# Patient Record
Sex: FEMALE | URBAN - METROPOLITAN AREA
[De-identification: names, ages, dates, MRNs, and addresses within clinical notes are randomized per-mention and may not be internally consistent; named-entity substitution may affect disease eponyms.]

---

## 2024-10-22 NOTE — PROGRESS NOTES
History of Present Illness    Huong Carlos is a 33 y.o. female who is seen at the request of Dr. Payam Johnson for the evaluation and management of a right parotid lesion.  I personally reviewed the scan that was done in October 2024.  It does show this mass in the substance of the right thyroid.  It measures probably more than a centimeter.  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  Of note is that this was discovered because the patient had a left-sided facial paralysis which should prompt some scanning that showed this contralateral lesion.  Her left-sided facial paralysis has recovered with the use of steroids.      Past Medical History    The past medical history and review of the system only shows some prior fibromas of the uterus for which she had surgery.  She also was diagnosed with Lyme's disease.  She does not take any medication.  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  She also has intolerance of a few pain medicines.  She does not smoke.  She is in school.  She is studying to be a .  She is here today with her .    Physical Exam    The patient is alert and oriented. Examination of the external ears, ear canals, and eardrums, is within normal limits. Examination of the anterior and external nose is negative. Examination of the oral cavity and oropharynx is normal. There is no evidence of any mucosal lesions. There is good mobility of the tongue and palate. There is good mandibular excursion. Palpation of the parotid, neck, and thyroid field fails to show any worrisome masses or adenopathies.  More specifically I cannot appreciate any masses around the parotid and I do not see any facial nerve weakness.    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  The pathology will be reviewed.  There is no question that the given her age that surgical removal is the option of choice.  She is  told about the surgery and the possible complications especially in relation to the proximity of the facial nerve.  She is therefore scheduled for a right parotidectomy with possible abdominal fat graft.  The patient wishes to go ahead with the surgery.    I will see her at the time of the surgery.

## 2024-10-23 ENCOUNTER — OFFICE VISIT (OUTPATIENT)
Dept: OTOLARYNGOLOGY | Facility: CLINIC | Age: 34
End: 2024-10-23
Payer: COMMERCIAL

## 2024-10-23 VITALS — TEMPERATURE: 96.8 F | WEIGHT: 150 LBS | HEIGHT: 67 IN | BODY MASS INDEX: 23.54 KG/M2

## 2024-10-23 DIAGNOSIS — K11.9 LESION OF PAROTID GLAND: Primary | ICD-10-CM

## 2024-10-23 PROCEDURE — 99244 OFF/OP CNSLTJ NEW/EST MOD 40: CPT | Performed by: OTOLARYNGOLOGY

## 2024-10-23 RX ORDER — ERGOCALCIFEROL 1.25 MG/1
CAPSULE ORAL
COMMUNITY
Start: 2023-09-15

## 2024-10-23 RX ORDER — SUMATRIPTAN SUCCINATE 100 MG/1
100 TABLET ORAL DAILY PRN
COMMUNITY

## 2024-10-23 RX ORDER — CYANOCOBALAMIN 1000 UG/ML
INJECTION, SOLUTION INTRAMUSCULAR; SUBCUTANEOUS
COMMUNITY
Start: 2023-08-11

## 2024-10-23 ASSESSMENT — PATIENT HEALTH QUESTIONNAIRE - PHQ9
1. LITTLE INTEREST OR PLEASURE IN DOING THINGS: NOT AT ALL
2. FEELING DOWN, DEPRESSED OR HOPELESS: NOT AT ALL
SUM OF ALL RESPONSES TO PHQ9 QUESTIONS 1 AND 2: 0

## 2024-10-30 ENCOUNTER — LAB REQUISITION (OUTPATIENT)
Dept: LAB | Facility: HOSPITAL | Age: 34
End: 2024-10-30
Payer: COMMERCIAL

## 2024-10-30 LAB
LAB AP ASR DISCLAIMER: NORMAL
LABORATORY COMMENT REPORT: NORMAL
PATH REPORT.FINAL DX SPEC: NORMAL
PATH REPORT.GROSS SPEC: NORMAL
PATH REPORT.TOTAL CANCER: NORMAL

## 2024-10-30 PROCEDURE — 88321 CONSLTJ&REPRT SLD PREP ELSWR: CPT | Performed by: STUDENT IN AN ORGANIZED HEALTH CARE EDUCATION/TRAINING PROGRAM

## 2024-11-06 ENCOUNTER — CLINICAL SUPPORT (OUTPATIENT)
Dept: PREADMISSION TESTING | Facility: HOSPITAL | Age: 34
End: 2024-11-06
Payer: COMMERCIAL

## 2024-11-06 NOTE — CPM/PAT NURSE NOTE
CPM/PAT Nurse Note      Name: Huong Carlos (Huong Carlos)  /Age:  y.o.       Past Medical History:   Diagnosis Date    ADHD (attention deficit hyperactivity disorder)     Lesion of parotid gland     Migraines        Past Surgical History:   Procedure Laterality Date    CARPAL TUNNEL RELEASE      HYSTERECTOMY      TUBAL LIGATION         Patient  has no history on file for sexual activity.    No family history on file.    Allergies   Allergen Reactions    Aspirin Anaphylaxis and Unknown     Other Reaction(s): Anaphylactic reaction to food    Clonidine Unknown and GI Upset     Other Reaction(s): extreme fatigue    Ibuprofen Anaphylaxis, GI bleeding and Unknown     Other Reaction(s): Anaphylactic reaction to food    Tramadol Anaphylaxis, Nausea And Vomiting, GI Upset, Unknown and Nausea/vomiting       Prior to Admission medications    Medication Sig Start Date End Date Taking? Authorizing Provider   cyanocobalamin (Vitamin B-12) 1,000 mcg/mL injection 1 mL Injection once a month for 90 days 23   Historical Provider, MD   ergocalciferol (Vitamin D-2) 1.25 MG (92286 UT) capsule Dose : 50,000 International_Unit = 1 cap(s), Oral, 2x/Wk, # 30 cap(s), 0 Refill(s) 9/15/23   Historical Provider, MD   SUMAtriptan (Imitrex) 100 mg tablet Take 1 tablet (100 mg) by mouth once daily as needed.    Historical Provider, MD SUBRAMANIAN ROS     DASI Risk Score    No data to display       Caprini DVT Assessment    No data to display       Modified Frailty Index    No data to display       CHADS2 Stroke Risk  Current as of 15 minutes ago        N/A 3 to 100%: High Risk   2 to < 3%: Medium Risk   0 to < 2%: Low Risk     Last Change: N/A          This score determines the patient's risk of having a stroke if the patient has atrial fibrillation.        This score is not applicable to this patient. Components are not calculated.          Revised Cardiac Risk Index    No data to display       Apfel Simplified  Score    No data to display       Risk Analysis Index Results This Encounter    No data found in the last 10 encounters.       Prodigy: High Risk  Total Score: 0          ARISCAT Score for Postoperative Pulmonary Complications    No data to display       [place] Perioperative Risk for Myocardial Infarction or Cardiac Arrest ([place])    No data to display     Scheduled for parotidectomy-right with excision adipose tissue graft torso with Dr. Almeida on 12/2/24  PCP: Dr. Sevilla P: [phone] F:[phone]  Otolaryngology:  Benedict Almeida MD LOV 10/23/24 seen at the request of Dr. Payam Johnson for the evaluation and management of a right parotid lesion.   ENT: Dr. Johnson  OBGYN: Magaly Swan  - CT ABDOMEN PELVIS WO CONTRAST; 7/3/24  OPINION:   Normal CT of the abdomen and pelvis.     Nurse Plan of Action:   Requested PCP office note   ONLY    Miriam Anderson RN  Pre-Admission Testing

## 2024-11-13 ENCOUNTER — PRE-ADMISSION TESTING (OUTPATIENT)
Dept: PREADMISSION TESTING | Facility: HOSPITAL | Age: 34
End: 2024-11-13
Payer: COMMERCIAL

## 2024-11-13 VITALS
HEIGHT: 66 IN | SYSTOLIC BLOOD PRESSURE: 104 MMHG | WEIGHT: 154.98 LBS | DIASTOLIC BLOOD PRESSURE: 71 MMHG | TEMPERATURE: 97.4 F | HEART RATE: 82 BPM | OXYGEN SATURATION: 99 % | BODY MASS INDEX: 24.91 KG/M2

## 2024-11-13 DIAGNOSIS — K11.9 LESION OF PAROTID GLAND: ICD-10-CM

## 2024-11-13 LAB
ANION GAP SERPL CALC-SCNC: 13 MMOL/L (ref 10–20)
BUN SERPL-MCNC: 11 MG/DL (ref 6–23)
CALCIUM SERPL-MCNC: 10.4 MG/DL (ref 8.6–10.6)
CHLORIDE SERPL-SCNC: 102 MMOL/L (ref 98–107)
CO2 SERPL-SCNC: 28 MMOL/L (ref 21–32)
CREAT SERPL-MCNC: 1.07 MG/DL (ref 0.5–1.05)
EGFRCR SERPLBLD CKD-EPI 2021: 71 ML/MIN/1.73M*2
ERYTHROCYTE [DISTWIDTH] IN BLOOD BY AUTOMATED COUNT: 12.1 % (ref 11.5–14.5)
GLUCOSE SERPL-MCNC: 85 MG/DL (ref 74–99)
HCT VFR BLD AUTO: 45.4 % (ref 36–46)
HGB BLD-MCNC: 15 G/DL (ref 12–16)
MCH RBC QN AUTO: 31 PG (ref 26–34)
MCHC RBC AUTO-ENTMCNC: 33 G/DL (ref 32–36)
MCV RBC AUTO: 94 FL (ref 80–100)
NRBC BLD-RTO: 0 /100 WBCS (ref 0–0)
PLATELET # BLD AUTO: 290 X10*3/UL (ref 150–450)
POTASSIUM SERPL-SCNC: 5.1 MMOL/L (ref 3.5–5.3)
RBC # BLD AUTO: 4.84 X10*6/UL (ref 4–5.2)
SODIUM SERPL-SCNC: 138 MMOL/L (ref 136–145)
WBC # BLD AUTO: 4.3 X10*3/UL (ref 4.4–11.3)

## 2024-11-13 PROCEDURE — 87081 CULTURE SCREEN ONLY: CPT

## 2024-11-13 PROCEDURE — 99244 OFF/OP CNSLTJ NEW/EST MOD 40: CPT | Performed by: NURSE PRACTITIONER

## 2024-11-13 PROCEDURE — 85027 COMPLETE CBC AUTOMATED: CPT

## 2024-11-13 PROCEDURE — 99406 BEHAV CHNG SMOKING 3-10 MIN: CPT | Performed by: NURSE PRACTITIONER

## 2024-11-13 PROCEDURE — 36415 COLL VENOUS BLD VENIPUNCTURE: CPT

## 2024-11-13 PROCEDURE — 82374 ASSAY BLOOD CARBON DIOXIDE: CPT

## 2024-11-13 RX ORDER — CHLORHEXIDINE GLUCONATE ORAL RINSE 1.2 MG/ML
SOLUTION DENTAL
Qty: 15 ML | Refills: 0 | Status: SHIPPED | OUTPATIENT
Start: 2024-11-13

## 2024-11-13 RX ORDER — CHLORHEXIDINE GLUCONATE 40 MG/ML
SOLUTION TOPICAL
Qty: 473 ML | Refills: 0 | Status: SHIPPED | OUTPATIENT
Start: 2024-11-13

## 2024-11-13 ASSESSMENT — ENCOUNTER SYMPTOMS
NECK SWELLING: 1
EYES NEGATIVE: 1
RESPIRATORY NEGATIVE: 1
NECK NEGATIVE: 1
CONSTITUTIONAL NEGATIVE: 1
TROUBLE SWALLOWING: 1
NEUROLOGICAL NEGATIVE: 1
GASTROINTESTINAL NEGATIVE: 1
NECK PAIN: 1
NECK MASS: 1
CARDIOVASCULAR NEGATIVE: 1
ENDOCRINE NEGATIVE: 1

## 2024-11-13 ASSESSMENT — DUKE ACTIVITY SCORE INDEX (DASI)
DASI METS SCORE: 9.9
CAN YOU DO HEAVY WORK AROUND THE HOUSE LIKE SCRUBBING FLOORS OR LIFTING AND MOVING HEAVY FURNITURE: YES
CAN YOU PARTICIPATE IN STRENOUS SPORTS LIKE SWIMMING, SINGLES TENNIS, FOOTBALL, BASKETBALL, OR SKIING: YES
CAN YOU DO MODERATE WORK AROUND THE HOUSE LIKE VACUUMING, SWEEPING FLOORS OR CARRYING GROCERIES: YES
CAN YOU DO LIGHT WORK AROUND THE HOUSE LIKE DUSTING OR WASHING DISHES: YES
CAN YOU RUN A SHORT DISTANCE: YES
TOTAL_SCORE: 58.2
CAN YOU WALK INDOORS, SUCH AS AROUND YOUR HOUSE: YES
CAN YOU TAKE CARE OF YOURSELF (EAT, DRESS, BATHE, OR USE TOILET): YES
CAN YOU DO YARD WORK LIKE RAKING LEAVES, WEEDING OR PUSHING A MOWER: YES
CAN YOU CLIMB A FLIGHT OF STAIRS OR WALK UP A HILL: YES
CAN YOU WALK A BLOCK OR TWO ON LEVEL GROUND: YES
CAN YOU PARTICIPATE IN MODERATE RECREATIONAL ACTIVITIES LIKE GOLF, BOWLING, DANCING, DOUBLES TENNIS OR THROWING A BASEBALL OR FOOTBALL: YES
CAN YOU HAVE SEXUAL RELATIONS: YES

## 2024-11-13 ASSESSMENT — LIFESTYLE VARIABLES: SMOKING_STATUS: NONSMOKER

## 2024-11-13 NOTE — PREPROCEDURE INSTRUCTIONS
Fasting Guidelines    NPO Instructions:    Do not eat any food after midnight the night before your surgery/procedure.  You may have up to 13.5 ounces of clear liquids until TWO hours before your instructed arrival time to the hospital. This includes water, black tea/coffee, (no milk or cream), apple juice, and/or electrolyte drinks (Gatorade).  You may chew gum up to TWO hours before your surgery/procedure.    Additional Instructions:     We have sent a prescription for Hibiclens soap and Peridex mouth wash to your preferred pharmacy.  If you have not already, Please  your prescription and start using as directed before surgery.  Follow the instruction sheet provided to you at your CPM/PAT appointment.    Avoid herbal supplements, multivitamins and NSAIDS (non-steroidal anti-inflammatory drugs) such as Advil, Aleve, Ibuprofen, Naproxen, Excedrin, Meloxicam or Celebrex for at least 7 days prior to surgery. May take Tylenol as needed.    Avoid tobacco and alcohol products for 24 hours prior to surgery.    CONTACT SURGEON'S OFFICE IF YOU DEVELOP:  * Fever = 100.4 F   * New respiratory symptoms (e.g. cough, shortness of breath, respiratory distress, sore throat)  * Recent loss of taste or smell  *Flu like symptoms such as headache, fatigue or gastrointestinal symptoms  * You develop any open sores, shingles, burning or painful urination   AND/OR:  * You no longer wish to have the surgery.  * Any other personal circumstances change that may lead to the need to cancel or defer this surgery.  *You were admitted to any hospital within one week of your planned procedure.    Seven/Six Days before Surgery:  Review your medication instructions, stop indicated medications    Day of Surgery:  Review your medication instructions, take indicated medications  Wear comfortable loose fitting clothing  Do not use moisturizers, creams, lotions or perfume  All jewelry and valuables should be left at home    Ramakrishna Daigle  [facility]  Usphr-[phone]  Jwz-[phone]  Email-[email or web address]      Patient Information: Pre-Operative Infection Prevention Measures     Why did I have my nose, under my arms, and groin swabbed?  The purpose of the swab is to identify Staphylococcus aureus inside your nose or on your skin.  The swab was sent to the laboratory for culture.  A positive swab/culture for Staphylococcus aureus is called colonization or carriage.      What is Staphylococcus aureus?  Staphylococcus aureus, also known as “staph”, is a germ found on the skin or in the nose of healthy people.  Sometimes Staphylococcus aureus can get into the body and cause an infection.  This can be minor (such as pimples, boils, or other skin problems).  It might also be serious (such as a blood infection, pneumonia, or a surgical site infection).    What is Staphylococcus aureus colonization or carriage?  Colonization or carriage means that a person has the germ but is not sick from it.  These bacteria can be spread on the hands or when breathing or sneezing.    How is Staphylococcus aureus spread?  It is most often spread by close contact with a person or item that carries it.    What happens if my culture is positive for Staphylococcus aureus?  Your doctor/medical team will use this information to guide any antibiotic treatment which may be necessary.  Regardless of the culture results, we will clean the inside of your nose with a betadine swab just before you have your surgery.      Will I get an infection if I have Staphylococcus aureus in my nose or on my skin?  Anyone can get an infection with Staphylococcus aureus.  However, the best way to reduce your risk of infection is to follow the instructions provided to you for the use of your CHG soap and dental rinse.        Patient Information: Oral/Dental Rinse    What is oral/dental rinse?   It is a mouthwash. It is a way of cleaning the mouth with a  germ-killing solution before your surgery.  The solution contains chlorhexidine, commonly known as CHG.   It is used inside the mouth to kill a bacteria known as Staphylococcus aureus.  Let your doctor know if you are allergic to Chlorhexidine.    Why do I need to use CHG oral/dental rinse?  The CHG oral/dental rinse helps to kill a bacteria in your mouth known as Staphylococcus aureus.     This reduces the risk of infection at the surgical site.      Using your CHG oral/dental rinse  STEPS:  Use your CHG oral/dental rinse after you brush your teeth the night before (at bedtime) and the morning of your surgery.  Follow all directions on your prescription label.    Use the cap on the container to measure 15ml   Swish (gargle if you can) the mouthwash in your mouth for at least 30 seconds, (do not swallow) and spit out  After you use your CHG rinse, do not rinse your mouth with water, drink or eat.  Please refer to the prescription label for the appropriate time to resume oral intake      What side effects might I have using the CHG oral/dental rinse?  CHG rinse will stick to plaque on the teeth.  Brush and floss just before use.  Teeth brushing will help avoid staining of plaque during use.      Patient Information: Home Preoperative Antibacterial Shower      What is a home preoperative antibacterial shower?  This shower is a way of cleaning the skin with a germ-killing solution before surgery.  The solution contains chlorhexidine, commonly known as CHG.  CHG is a skin cleanser with germ-killing ability.  Let your doctor know if you are allergic to chlorhexidine.    Why do I need to take a preoperative antibacterial shower?  Skin is not sterile.  It is best to try to make your skin as free of germs as possible before surgery.  Proper cleansing with a germ-killing soap before surgery can lower the number of germs on your skin.  This helps to reduce the risk of infection at the surgical site.  Following the  instructions listed below will help you prepare your skin for surgery.      How do I use the solution?  Steps:  Begin using your CHG soap 5 days before your scheduled surgery on ________________________.    First, wash and rinse your hair using the CHG soap. Keep CHG soap away from ear canals and eyes.  Rinse completely, do not condition.  Hair extensions should be removed.  Wash your face with your normal soap and rinse.    Apply the CHG solution to a clean wet washcloth.  Turn the water off or move away from the water spray to avoid premature rinsing of the CHG soap as you are applying.   Firmly lather your entire body from the neck down.  Do not use on your face.  Pay special attention to the area(s) where your incision(s) will be located unless they are on your face.  Avoid scrubbing your skin too hard.  The important point is to have the CHG soap sit on your skin for 3 minutes.    When the 3 minutes are up, turn on the water and rinse the CHG solution off your body completely.   DO NOT wash with regular soap after you have used the CHG soap solution  Pat yourself dry with a clean, freshly-laundered towel.  DO NOT apply powders, deodorants, or lotions.  Dress in clean, freshly laundered nightclothes.    Be sure to sleep with clean, freshly laundered sheets.  Be aware that CHG will cause stains on fabrics; if you wash them with bleach after use.  Rinse your washcloth and other linens that have contact with CHG completely.  Use only non-chlorine detergents to launder the items used.   The morning of surgery is the fifth day.  Repeat the above steps and dress in clean comfortable clothing     Whom should I contact if I have any questions regarding the use of CHG soap?  Call the [facility] at [phone] if you have any questions.               Preoperative Brain Exercises    What are brain exercises?  A brain exercise is any activity that  engages your thinking (cognitive) skills.    What types of activities are considered brain exercises?  Jigsaw puzzles, crossword puzzles, word jumble, memory games, word search, and many more.  Many can be found free online or on your phone via a mobile evy.    Why should I do brain exercises before my surgery?  More recent research has shown brain exercise before surgery can lower the risk of postoperative delirium (confusion) which can be especially important for older adults.  Patients who did brain exercises for 5 to 10 hours the days before surgery, cut their risk of postoperative delirium in half up to 1 week after surgery.         [facility]    Preoperative Deep Breathing Exercises    Why it is important to do deep breathing exercises before my surgery?  Deep breathing exercises strengthen your breathing muscles.  This helps you to recover after your surgery and decreases the chance of breathing complications.      How are the deep breathing exercises done?  Sit straight with your back supported.  Breathe in deeply and slowly through your nose. Your lower rib cage should expand and your abdomen may move forward.  Hold that breath for 3 to 5 seconds.  Breathe out through pursed lips, slowly and completely.  Rest and repeat 10 times every hour while awake.  Rest longer if you become dizzy or lightheaded.         Patient and Family Education             Ways You Can Help Prevent Blood Clots             This handout explains some simple things you can do to help prevent blood clots.      Blood clots are blockages that can form in the body's veins. When a blood clot forms in your deep veins, it may be called a deep vein thrombosis, or DVT for short. Blood clots can happen in any part of the body where blood flows, but they are most common in the arms and legs. If a piece of a blood clot breaks free and travels to the lungs, it is called a pulmonary embolus (PE). A PE can be a very  serious problem.         Being in the hospital or having surgery can raise your chances of getting a blood clot because you may not be well enough to move around as much as you normally do.         Ways you can help prevent blood clots in the hospital         Wearing SCDs. SCDs stands for Sequential Compression Devices.   SCDs are special sleeves that wrap around your legs  They attach to a pump that fills them with air to gently squeeze your legs every few minutes.   This helps return the blood in your legs to your heart.   SCDs should only be taken off when walking or bathing.   SCDs may not be comfortable, but they can help save your life.               Wearing compression stockings - if your doctor orders them. These special snug fitting stockings gently squeeze your legs to help blood flow.       Walking. Walking helps move the blood in your legs.   If your doctor says it is ok, try walking the halls at least   5 times a day. Ask us to help you get up, so you don't fall.      Taking any blood thinning medicines your doctor orders.        Page 1 of 2     [facility]; 3/23   Ways you can help prevent blood clots at home       Wearing compression stockings - if your doctor orders them. ? Walking - to help move the blood in your legs.       Taking any blood thinning medicines your doctor orders.      Signs of a blood clot or PE      Tell your doctor or nurse know right away if you have of the problems listed below.    If you are at home, seek medical care right away. Call 911 for chest pain or problems breathing.               Signs of a blood clot (DVT) - such as pain,  swelling, redness or warmth in your arm or leg      Signs of a pulmonary embolism (PE) - such as chest     pain or feeling short of breath

## 2024-11-13 NOTE — H&P (VIEW-ONLY)
CPM/PAT Evaluation       Name: Huong Carlos (Huong Carlos)  /Age:  y.o.     Visit Type:   In-Person       Chief Complaint: preop eval    HPI  The patient is a 33 year old female with right parotid lesion. 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 She currently denies fever, chills, n/v, abdominal pain, chest pain, sob or dysphagia. She presents today for perioperative evaluation in anticipation of Parotidectomy - Right  Excision Adipose Tissue Graft Torso on 24 with Dr. Almeida.    Past Medical History:   Diagnosis Date    ADHD (attention deficit hyperactivity disorder)     Anemia     Dysphagia     Fibroid     s/p hysterectomy    History of recent steroid use     Lesion of parotid gland     Migraines        Past Surgical History:   Procedure Laterality Date    CARPAL TUNNEL RELEASE Right     x3    COLONOSCOPY      ENDOMETRIAL ABLATION      HYSTERECTOMY      SHOULDER Right     TUBAL LIGATION         Patient Sexual activity questions deferred to the physician.    Family History   Problem Relation Name Age of Onset    PONV Mother      Breast cancer Mother      Hypertension Father      Blood clot Father         Allergies   Allergen Reactions    Aspirin Anaphylaxis and Unknown     Other Reaction(s): Anaphylactic reaction to food    Clonidine Unknown and GI Upset     Other Reaction(s): extreme fatigue    Ibuprofen Anaphylaxis, GI bleeding and Unknown     Other Reaction(s): Anaphylactic reaction to food    Tramadol Anaphylaxis, Nausea And Vomiting, GI Upset, Unknown and Nausea/vomiting       Prior to Admission medications    Medication Sig Start Date End Date Taking? Authorizing Provider   cyanocobalamin (Vitamin B-12) 1,000 mcg/mL injection 1 mL Injection once a month for 90 days 23   Historical Provider, MD   ergocalciferol (Vitamin D-2) 1.25 MG (09191 UT) capsule Dose : 50,000 International_Unit = 1 cap(s), Oral, 2x/Wk, # 30 cap(s), 0 Refill(s) 9/15/23    "Historical Provider, MD   SUMAtriptan (Imitrex) 100 mg tablet Take 1 tablet (100 mg) by mouth once daily as needed.    Historical Provider, MD SUBRAMANIAN ROS:   Constitutional:   neg    Neuro/Psych:   neg    Eyes:   neg     use of corrective lenses  Ears:   Nose:   neg    Mouth:   neg    Throat:   neg     dysphagia (intermittent)  Neck:    intermittent  neg     neck pain   neck swelling   neck masses  Cardio:   neg    Respiratory:   neg    Endocrine:   neg    GI:   neg    :   neg    Musculoskeletal:   Hematologic:   neg    Skin:  neg        Physical Exam  Vitals reviewed.   Constitutional:       Appearance: Normal appearance.   HENT:      Head: Normocephalic and atraumatic.      Nose: Nose normal.      Mouth/Throat:      Mouth: Mucous membranes are moist.      Pharynx: Oropharynx is clear.   Eyes:      Extraocular Movements: Extraocular movements intact.      Conjunctiva/sclera: Conjunctivae normal.      Pupils: Pupils are equal, round, and reactive to light.   Neck:      Comments: intermittent  Cardiovascular:      Rate and Rhythm: Normal rate and regular rhythm.      Pulses: Normal pulses.      Heart sounds: Normal heart sounds.   Pulmonary:      Effort: Pulmonary effort is normal.      Breath sounds: Normal breath sounds.   Musculoskeletal:         General: Normal range of motion.      Cervical back: Normal range of motion. Tenderness present.   Skin:     General: Skin is warm and dry.   Neurological:      General: No focal deficit present.      Mental Status: She is alert and oriented to person, place, and time.   Psychiatric:         Mood and Affect: Mood normal.         Behavior: Behavior normal.          PAT AIRWAY:   Airway:     Mallampati::  I    TM distance::  >3 FB    Neck ROM::  Full   Upper and lower   partials      Visit Vitals  /71   Pulse 82   Temp 36.3 °C (97.4 °F)   Ht 1.676 m (5' 6\")   Wt 70.3 kg (154 lb 15.7 oz)   SpO2 99%   BMI 25.01 kg/m²   Smoking Status Never   BSA 1.81 m²    "       DASI Risk Score      Flowsheet Row Pre-Admission Testing from 11/13/2024 in [facility]   Can you take care of yourself (eat, dress, bathe, or use toilet)?  2.75 filed at 11/13/2024 1012   Can you walk indoors, such as around your house? 1.75 filed at 11/13/2024 1012   Can you walk a block or two on level ground?  2.75 filed at 11/13/2024 1012   Can you climb a flight of stairs or walk up a hill? 5.5 filed at 11/13/2024 1012   Can you run a short distance? 8 filed at 11/13/2024 1012   Can you do light work around the house like dusting or washing dishes? 2.7 filed at 11/13/2024 1012   Can you do moderate work around the house like vacuuming, sweeping floors or carrying groceries? 3.5 filed at 11/13/2024 1012   Can you do heavy work around the house like scrubbing floors or lifting and moving heavy furniture?  8 filed at 11/13/2024 1012   Can you do yard work like raking leaves, weeding or pushing a mower? 4.5 filed at 11/13/2024 1012   Can you have sexual relations? 5.25 filed at 11/13/2024 1012   Can you participate in moderate recreational activities like golf, bowling, dancing, doubles tennis or throwing a baseball or football? 6 filed at 11/13/2024 1012   Can you participate in strenous sports like swimming, singles tennis, football, basketball, or skiing? 7.5 filed at 11/13/2024 1012   DASI SCORE 58.2 filed at 11/13/2024 1012   METS Score (Will be calculated only when all the questions are answered) 9.9 filed at 11/13/2024 1012          Caprini DVT Assessment      Flowsheet Row Pre-Admission Testing from 11/13/2024 in [facility]   DVT Score 3 filed at 11/13/2024 1013   Surgical Factors Major surgery planned, including arthroscopic and laproscopic (1-2 hours) filed at 11/13/2024 1013   BMI 30 or less filed at 11/13/2024 1013          Modified Frailty Index      Flowsheet Row Pre-Admission Testing from 11/13/2024 in [facility]   Non-independent functional  status (problems with dressing, bathing, personal grooming, or cooking) 0 filed at 11/13/2024 1014   History of diabetes mellitus  0 filed at 11/13/2024 1014   History of COPD 0 filed at 11/13/2024 1014   History of CHF No filed at 11/13/2024 1014   History of MI 0 filed at 11/13/2024 1014   History of Percutaneous Coronary Intervention, Cardiac Surgery, or Angina No filed at 11/13/2024 1014   Hypertension requiring the use of medication  0 filed at 11/13/2024 1014   Peripheral vascular disease 0 filed at 11/13/2024 1014   Impaired sensorium (cognitive impairement or loss, clouding, or delirium) 0 filed at 11/13/2024 1014   TIA or CVA withouy residual deficit 0 filed at 11/13/2024 1014   Cerebrovascular accident with deficit 0 filed at 11/13/2024 1014   Modified Frailty Index Calculator 0 filed at 11/13/2024 1014          CHADS2 Stroke Risk  Current as of today        N/A 3 to 100%: High Risk   2 to < 3%: Medium Risk   0 to < 2%: Low Risk     Last Change: N/A          This score determines the patient's risk of having a stroke if the patient has atrial fibrillation.        This score is not applicable to this patient. Components are not calculated.          Revised Cardiac Risk Index      Flowsheet Row Pre-Admission Testing from 11/13/2024 in [facility]   High-Risk Surgery (Intraperitoneal, Intrathoracic,Suprainguinal vascular) 0 filed at 11/13/2024 1013   History of ischemic heart disease (History of MI, History of positive exercuse test, Current chest paint considered due to myocardial ischemia, Use of nitrate therapy, ECG with pathological Q Waves) 0 filed at 11/13/2024 1013   History of congestive heart failure (pulmonary edemia, bilateral rales or S3 gallop, Paroxysmal nocturnal dyspnea, CXR showing pulmonary vascular redistribution) 0 filed at 11/13/2024 1013   History of cerebrovascular disease (Prior TIA or stroke) 0 filed at 11/13/2024 1013   Pre-operative insulin treatment 0 filed at  11/13/2024 1013   Pre-operative creatinine>2 mg/dl 0 filed at 11/13/2024 1013   Revised Cardiac Risk Calculator 0 filed at 11/13/2024 1013          Apfel Simplified Score      Flowsheet Row Pre-Admission Testing from 11/13/2024 in [facility]   Smoking status 1 filed at 11/13/2024 1013   History of motion sickness or PONV  0 filed at 11/13/2024 1013   Use of postoperative opioids 1 filed at 11/13/2024 1013   Gender - Female 1=Yes filed at 11/13/2024 1013   Apfel Simplified Score Calculator 3 filed at 11/13/2024 1013          Risk Analysis Index Results This Encounter    No data found in the last 10 encounters.       Stop Bang Score      Flowsheet Row Pre-Admission Testing from 11/13/2024 in [facility]   Do you snore loudly? 0 filed at 11/13/2024 1012   Do you often feel tired or fatigued after your sleep? 0 filed at 11/13/2024 1012   Has anyone ever observed you stop breathing in your sleep? 0 filed at 11/13/2024 1012   Do you have or are you being treated for high blood pressure? 0 filed at 11/13/2024 1012   Recent BMI (Calculated) 23.9 filed at 11/13/2024 1012   Is BMI greater than 35 kg/m2? 0=No filed at 11/13/2024 1012   Age older than 50 years old? 0=No filed at 11/13/2024 1012   Is your neck circumference greater than 17 inches (Male) or 16 inches (Female)? 0 filed at 11/13/2024 1012   Gender - Male 0=No filed at 11/13/2024 1012   STOP-BANG Total Score 0 filed at 11/13/2024 1012          Prodigy: High Risk  Total Score: 0          ARISCAT Score for Postoperative Pulmonary Complications    No data to display       [place] Perioperative Risk for Myocardial Infarction or Cardiac Arrest (STERLING)    No data to display       Recent Results (from the past week)   Staphylococcus aureus/MRSA colonization, Culture    Collection Time: 11/13/24 10:44 AM    Specimen: Anterior Nares; Swab   Result Value Ref Range    Staph/MRSA Screen Culture No Staphylococcus aureus isolated    Basic Metabolic  Panel    Collection Time: 11/13/24 10:44 AM   Result Value Ref Range    Glucose 85 74 - 99 mg/dL    Sodium 138 136 - 145 mmol/L    Potassium 5.1 3.5 - 5.3 mmol/L    Chloride 102 98 - 107 mmol/L    Bicarbonate 28 21 - 32 mmol/L    Anion Gap 13 10 - 20 mmol/L    Urea Nitrogen 11 6 - 23 mg/dL    Creatinine 1.07 (H) 0.50 - 1.05 mg/dL    eGFR 71 >60 mL/min/1.73m*2    Calcium 10.4 8.6 - 10.6 mg/dL   CBC    Collection Time: 11/13/24 10:44 AM   Result Value Ref Range    WBC 4.3 (L) 4.4 - 11.3 x10*3/uL    nRBC 0.0 0.0 - 0.0 /100 WBCs    RBC 4.84 4.00 - 5.20 x10*6/uL    Hemoglobin 15.0 12.0 - 16.0 g/dL    Hematocrit 45.4 36.0 - 46.0 %    MCV 94 80 - 100 fL    MCH 31.0 26.0 - 34.0 pg    MCHC 33.0 32.0 - 36.0 g/dL    RDW 12.1 11.5 - 14.5 %    Platelets 290 150 - 450 x10*3/uL        Diagnostic Results    EKG 3/29/21(see care everywhere)  Normal sinus rhythm with sinus arrhythmia   Normal ECG   No previous ECGs available       Assessment and Plan:     Anesthesia  The patient denies problems with anesthesia in the past such as PONV, prolonged sedation, awareness, dental damage, aspiration, cardiac arrest, difficult intubation, or unexpected hospital admissions.     Cardiovascular  No diagnoses or significant findings on chart review or clinical presentation and evaluation.  She is scheduled for non-cardiac surgery associated with elevated risk. The patient has no major cardiac contraindications to non- cardiac surgery.  Cardiology Evaluation  The patient is not followed by cardiology.  METS  The patient's functional capacity capacity is greater than 4 METS.  RCRI  The patient meets 0 RCRI criteria and therefor has a 3.9% risk of major adverse cardiac complications.  STERLING score which indicates a 0% risk of intraoperative or 30-day postoperative MACE (major adverse cardiac event).    Pulmonary  No significant findings on chart review or clinical presentation and evaluation.    The patient has a stop bang score of 0, which places  patient at low risk for having KRISTEL.    ARISCAT 0, low, 1.6% risk of in-hospital postoperative pulmonary complications  PRODIGY 3, low risk of respiratory depression episode.     Patient given PI sheet for preoperative deep breathing exercises.  Encourage  incentive spirometry in the postoperative period as deemed necessary.    Neurology  #ADHD  -previously on adderall, not currently taking while in school, takes OTC multivitamins  #Migraines  -managed on Imitrex, instructed to hold day of surgery.  The patient is at increased risk for perioperative stroke secondary to female gender, general anesthesia. Handouts for preoperative brain exercises given to patient.    HEENT/Airway  #Parotid lesion  -intermittent facial paralysis, numbness and swelling, scheduled for right parotidectomy on 12/2 with Dr. Almeida.  No documented or reported history of airway difficulty.     Endocrine  No diagnoses or significant findings on chart review or clinical presentation and evaluation.    Gastrointestinal  No diagnoses or significant findings on chart review or clinical presentation and evaluation.    Eat 10- 0,  self-perceived oropharyngeal dysphagia scale (0-40)     Genitourinary  No diagnoses or significant findings on chart review or clinical presentation and evaluation.    Renal  No renal diagnoses or significant findings on chart review or clinical presentation and evaluation.    Hematology  No diagnoses or significant findings on chart review or clinical presentation and evaluation.    Caprini score 3, intermediate risk of perioperative VTE.     Patient instructed to ambulate as soon as possible postoperatively to decrease thromboembolic risk. Initiate mechanical DVT prophylaxis as soon as possible and initiate chemical prophylaxis when deemed safe from a bleeding standpoint post surgery.     Transfusion Evaluation  T&S not obtained. Low likelihood for perioperative transfusion of blood or blood  products.    Musculoskeletal  No diagnoses or significant findings on chart review or clinical presentation and evaluation.    ID  No diagnoses or significant findings on chart review or clinical presentation and evaluation. MRSA screening obtained. Prescriptions and instructions given for Hibiclens and Peridex.    -Preoperative medication instructions were provided and reviewed with the patient.  Any additional testing or evaluation was explained to the patient.  NPO Instructions were discussed, and the patient's questions were answered prior to conclusion of this encounter. Patient verbalized understanding of preoperative instructions. After Visit Summary given.

## 2024-11-13 NOTE — CPM/PAT H&P
CPM/PAT Evaluation       Name: Huong Carlos (Huong Carlos)  /Age:  y.o.     Visit Type:   In-Person       Chief Complaint: preop eval    HPI  The patient is a 33 year old female with right parotid lesion. 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 She currently denies fever, chills, n/v, abdominal pain, chest pain, sob or dysphagia. She presents today for perioperative evaluation in anticipation of Parotidectomy - Right  Excision Adipose Tissue Graft Torso on 24 with Dr. Almeida.    Past Medical History:   Diagnosis Date    ADHD (attention deficit hyperactivity disorder)     Anemia     Dysphagia     Fibroid     s/p hysterectomy    History of recent steroid use     Lesion of parotid gland     Migraines        Past Surgical History:   Procedure Laterality Date    CARPAL TUNNEL RELEASE Right     x3    COLONOSCOPY      ENDOMETRIAL ABLATION      HYSTERECTOMY      SHOULDER Right     TUBAL LIGATION         Patient Sexual activity questions deferred to the physician.    Family History   Problem Relation Name Age of Onset    PONV Mother      Breast cancer Mother      Hypertension Father      Blood clot Father         Allergies   Allergen Reactions    Aspirin Anaphylaxis and Unknown     Other Reaction(s): Anaphylactic reaction to food    Clonidine Unknown and GI Upset     Other Reaction(s): extreme fatigue    Ibuprofen Anaphylaxis, GI bleeding and Unknown     Other Reaction(s): Anaphylactic reaction to food    Tramadol Anaphylaxis, Nausea And Vomiting, GI Upset, Unknown and Nausea/vomiting       Prior to Admission medications    Medication Sig Start Date End Date Taking? Authorizing Provider   cyanocobalamin (Vitamin B-12) 1,000 mcg/mL injection 1 mL Injection once a month for 90 days 23   Historical Provider, MD   ergocalciferol (Vitamin D-2) 1.25 MG (32919 UT) capsule Dose : 50,000 International_Unit = 1 cap(s), Oral, 2x/Wk, # 30 cap(s), 0 Refill(s) 9/15/23    "Historical Provider, MD   SUMAtriptan (Imitrex) 100 mg tablet Take 1 tablet (100 mg) by mouth once daily as needed.    Historical Provider, MD SUBRAMANIAN ROS:   Constitutional:   neg    Neuro/Psych:   neg    Eyes:   neg     use of corrective lenses  Ears:   Nose:   neg    Mouth:   neg    Throat:   neg     dysphagia (intermittent)  Neck:    intermittent  neg     neck pain   neck swelling   neck masses  Cardio:   neg    Respiratory:   neg    Endocrine:   neg    GI:   neg    :   neg    Musculoskeletal:   Hematologic:   neg    Skin:  neg        Physical Exam  Vitals reviewed.   Constitutional:       Appearance: Normal appearance.   HENT:      Head: Normocephalic and atraumatic.      Nose: Nose normal.      Mouth/Throat:      Mouth: Mucous membranes are moist.      Pharynx: Oropharynx is clear.   Eyes:      Extraocular Movements: Extraocular movements intact.      Conjunctiva/sclera: Conjunctivae normal.      Pupils: Pupils are equal, round, and reactive to light.   Neck:      Comments: intermittent  Cardiovascular:      Rate and Rhythm: Normal rate and regular rhythm.      Pulses: Normal pulses.      Heart sounds: Normal heart sounds.   Pulmonary:      Effort: Pulmonary effort is normal.      Breath sounds: Normal breath sounds.   Musculoskeletal:         General: Normal range of motion.      Cervical back: Normal range of motion. Tenderness present.   Skin:     General: Skin is warm and dry.   Neurological:      General: No focal deficit present.      Mental Status: She is alert and oriented to person, place, and time.   Psychiatric:         Mood and Affect: Mood normal.         Behavior: Behavior normal.          PAT AIRWAY:   Airway:     Mallampati::  I    TM distance::  >3 FB    Neck ROM::  Full   Upper and lower   partials      Visit Vitals  /71   Pulse 82   Temp 36.3 °C (97.4 °F)   Ht 1.676 m (5' 6\")   Wt 70.3 kg (154 lb 15.7 oz)   SpO2 99%   BMI 25.01 kg/m²   Smoking Status Never   BSA 1.81 m²    "       DASI Risk Score      Flowsheet Row Pre-Admission Testing from 11/13/2024 in [facility]   Can you take care of yourself (eat, dress, bathe, or use toilet)?  2.75 filed at 11/13/2024 1012   Can you walk indoors, such as around your house? 1.75 filed at 11/13/2024 1012   Can you walk a block or two on level ground?  2.75 filed at 11/13/2024 1012   Can you climb a flight of stairs or walk up a hill? 5.5 filed at 11/13/2024 1012   Can you run a short distance? 8 filed at 11/13/2024 1012   Can you do light work around the house like dusting or washing dishes? 2.7 filed at 11/13/2024 1012   Can you do moderate work around the house like vacuuming, sweeping floors or carrying groceries? 3.5 filed at 11/13/2024 1012   Can you do heavy work around the house like scrubbing floors or lifting and moving heavy furniture?  8 filed at 11/13/2024 1012   Can you do yard work like raking leaves, weeding or pushing a mower? 4.5 filed at 11/13/2024 1012   Can you have sexual relations? 5.25 filed at 11/13/2024 1012   Can you participate in moderate recreational activities like golf, bowling, dancing, doubles tennis or throwing a baseball or football? 6 filed at 11/13/2024 1012   Can you participate in strenous sports like swimming, singles tennis, football, basketball, or skiing? 7.5 filed at 11/13/2024 1012   DASI SCORE 58.2 filed at 11/13/2024 1012   METS Score (Will be calculated only when all the questions are answered) 9.9 filed at 11/13/2024 1012          Caprini DVT Assessment      Flowsheet Row Pre-Admission Testing from 11/13/2024 in [facility]   DVT Score 3 filed at 11/13/2024 1013   Surgical Factors Major surgery planned, including arthroscopic and laproscopic (1-2 hours) filed at 11/13/2024 1013   BMI 30 or less filed at 11/13/2024 1013          Modified Frailty Index      Flowsheet Row Pre-Admission Testing from 11/13/2024 in [facility]   Non-independent functional  status (problems with dressing, bathing, personal grooming, or cooking) 0 filed at 11/13/2024 1014   History of diabetes mellitus  0 filed at 11/13/2024 1014   History of COPD 0 filed at 11/13/2024 1014   History of CHF No filed at 11/13/2024 1014   History of MI 0 filed at 11/13/2024 1014   History of Percutaneous Coronary Intervention, Cardiac Surgery, or Angina No filed at 11/13/2024 1014   Hypertension requiring the use of medication  0 filed at 11/13/2024 1014   Peripheral vascular disease 0 filed at 11/13/2024 1014   Impaired sensorium (cognitive impairement or loss, clouding, or delirium) 0 filed at 11/13/2024 1014   TIA or CVA withouy residual deficit 0 filed at 11/13/2024 1014   Cerebrovascular accident with deficit 0 filed at 11/13/2024 1014   Modified Frailty Index Calculator 0 filed at 11/13/2024 1014          CHADS2 Stroke Risk  Current as of today        N/A 3 to 100%: High Risk   2 to < 3%: Medium Risk   0 to < 2%: Low Risk     Last Change: N/A          This score determines the patient's risk of having a stroke if the patient has atrial fibrillation.        This score is not applicable to this patient. Components are not calculated.          Revised Cardiac Risk Index      Flowsheet Row Pre-Admission Testing from 11/13/2024 in [facility]   High-Risk Surgery (Intraperitoneal, Intrathoracic,Suprainguinal vascular) 0 filed at 11/13/2024 1013   History of ischemic heart disease (History of MI, History of positive exercuse test, Current chest paint considered due to myocardial ischemia, Use of nitrate therapy, ECG with pathological Q Waves) 0 filed at 11/13/2024 1013   History of congestive heart failure (pulmonary edemia, bilateral rales or S3 gallop, Paroxysmal nocturnal dyspnea, CXR showing pulmonary vascular redistribution) 0 filed at 11/13/2024 1013   History of cerebrovascular disease (Prior TIA or stroke) 0 filed at 11/13/2024 1013   Pre-operative insulin treatment 0 filed at  11/13/2024 1013   Pre-operative creatinine>2 mg/dl 0 filed at 11/13/2024 1013   Revised Cardiac Risk Calculator 0 filed at 11/13/2024 1013          Apfel Simplified Score      Flowsheet Row Pre-Admission Testing from 11/13/2024 in [facility]   Smoking status 1 filed at 11/13/2024 1013   History of motion sickness or PONV  0 filed at 11/13/2024 1013   Use of postoperative opioids 1 filed at 11/13/2024 1013   Gender - Female 1=Yes filed at 11/13/2024 1013   Apfel Simplified Score Calculator 3 filed at 11/13/2024 1013          Risk Analysis Index Results This Encounter    No data found in the last 10 encounters.       Stop Bang Score      Flowsheet Row Pre-Admission Testing from 11/13/2024 in [facility]   Do you snore loudly? 0 filed at 11/13/2024 1012   Do you often feel tired or fatigued after your sleep? 0 filed at 11/13/2024 1012   Has anyone ever observed you stop breathing in your sleep? 0 filed at 11/13/2024 1012   Do you have or are you being treated for high blood pressure? 0 filed at 11/13/2024 1012   Recent BMI (Calculated) 23.9 filed at 11/13/2024 1012   Is BMI greater than 35 kg/m2? 0=No filed at 11/13/2024 1012   Age older than 50 years old? 0=No filed at 11/13/2024 1012   Is your neck circumference greater than 17 inches (Male) or 16 inches (Female)? 0 filed at 11/13/2024 1012   Gender - Male 0=No filed at 11/13/2024 1012   STOP-BANG Total Score 0 filed at 11/13/2024 1012          Prodigy: High Risk  Total Score: 0          ARISCAT Score for Postoperative Pulmonary Complications    No data to display       [place] Perioperative Risk for Myocardial Infarction or Cardiac Arrest (STERLING)    No data to display       Recent Results (from the past week)   Staphylococcus aureus/MRSA colonization, Culture    Collection Time: 11/13/24 10:44 AM    Specimen: Anterior Nares; Swab   Result Value Ref Range    Staph/MRSA Screen Culture No Staphylococcus aureus isolated    Basic Metabolic  Panel    Collection Time: 11/13/24 10:44 AM   Result Value Ref Range    Glucose 85 74 - 99 mg/dL    Sodium 138 136 - 145 mmol/L    Potassium 5.1 3.5 - 5.3 mmol/L    Chloride 102 98 - 107 mmol/L    Bicarbonate 28 21 - 32 mmol/L    Anion Gap 13 10 - 20 mmol/L    Urea Nitrogen 11 6 - 23 mg/dL    Creatinine 1.07 (H) 0.50 - 1.05 mg/dL    eGFR 71 >60 mL/min/1.73m*2    Calcium 10.4 8.6 - 10.6 mg/dL   CBC    Collection Time: 11/13/24 10:44 AM   Result Value Ref Range    WBC 4.3 (L) 4.4 - 11.3 x10*3/uL    nRBC 0.0 0.0 - 0.0 /100 WBCs    RBC 4.84 4.00 - 5.20 x10*6/uL    Hemoglobin 15.0 12.0 - 16.0 g/dL    Hematocrit 45.4 36.0 - 46.0 %    MCV 94 80 - 100 fL    MCH 31.0 26.0 - 34.0 pg    MCHC 33.0 32.0 - 36.0 g/dL    RDW 12.1 11.5 - 14.5 %    Platelets 290 150 - 450 x10*3/uL        Diagnostic Results    EKG 3/29/21(see care everywhere)  Normal sinus rhythm with sinus arrhythmia   Normal ECG   No previous ECGs available       Assessment and Plan:     Anesthesia  The patient denies problems with anesthesia in the past such as PONV, prolonged sedation, awareness, dental damage, aspiration, cardiac arrest, difficult intubation, or unexpected hospital admissions.     Cardiovascular  No diagnoses or significant findings on chart review or clinical presentation and evaluation.  She is scheduled for non-cardiac surgery associated with elevated risk. The patient has no major cardiac contraindications to non- cardiac surgery.  Cardiology Evaluation  The patient is not followed by cardiology.  METS  The patient's functional capacity capacity is greater than 4 METS.  RCRI  The patient meets 0 RCRI criteria and therefor has a 3.9% risk of major adverse cardiac complications.  STERLING score which indicates a 0% risk of intraoperative or 30-day postoperative MACE (major adverse cardiac event).    Pulmonary  No significant findings on chart review or clinical presentation and evaluation.    The patient has a stop bang score of 0, which places  patient at low risk for having KRISTEL.    ARISCAT 0, low, 1.6% risk of in-hospital postoperative pulmonary complications  PRODIGY 3, low risk of respiratory depression episode.     Patient given PI sheet for preoperative deep breathing exercises.  Encourage  incentive spirometry in the postoperative period as deemed necessary.    Neurology  #ADHD  -previously on adderall, not currently taking while in school, takes OTC multivitamins  #Migraines  -managed on Imitrex, instructed to hold day of surgery.  The patient is at increased risk for perioperative stroke secondary to female gender, general anesthesia. Handouts for preoperative brain exercises given to patient.    HEENT/Airway  #Parotid lesion  -intermittent facial paralysis, numbness and swelling, scheduled for right parotidectomy on 12/2 with Dr. Almeida.  No documented or reported history of airway difficulty.     Endocrine  No diagnoses or significant findings on chart review or clinical presentation and evaluation.    Gastrointestinal  No diagnoses or significant findings on chart review or clinical presentation and evaluation.    Eat 10- 0,  self-perceived oropharyngeal dysphagia scale (0-40)     Genitourinary  No diagnoses or significant findings on chart review or clinical presentation and evaluation.    Renal  No renal diagnoses or significant findings on chart review or clinical presentation and evaluation.    Hematology  No diagnoses or significant findings on chart review or clinical presentation and evaluation.    Caprini score 3, intermediate risk of perioperative VTE.     Patient instructed to ambulate as soon as possible postoperatively to decrease thromboembolic risk. Initiate mechanical DVT prophylaxis as soon as possible and initiate chemical prophylaxis when deemed safe from a bleeding standpoint post surgery.     Transfusion Evaluation  T&S not obtained. Low likelihood for perioperative transfusion of blood or blood  products.    Musculoskeletal  No diagnoses or significant findings on chart review or clinical presentation and evaluation.    ID  No diagnoses or significant findings on chart review or clinical presentation and evaluation. MRSA screening obtained. Prescriptions and instructions given for Hibiclens and Peridex.    -Preoperative medication instructions were provided and reviewed with the patient.  Any additional testing or evaluation was explained to the patient.  NPO Instructions were discussed, and the patient's questions were answered prior to conclusion of this encounter. Patient verbalized understanding of preoperative instructions. After Visit Summary given.

## 2024-11-14 LAB — STAPHYLOCOCCUS SPEC CULT: NORMAL

## 2024-12-01 ENCOUNTER — ANESTHESIA EVENT (OUTPATIENT)
Dept: OPERATING ROOM | Facility: HOSPITAL | Age: 34
End: 2024-12-01
Payer: COMMERCIAL

## 2024-12-02 ENCOUNTER — ANESTHESIA (OUTPATIENT)
Dept: OPERATING ROOM | Facility: HOSPITAL | Age: 34
End: 2024-12-02
Payer: COMMERCIAL

## 2024-12-02 ENCOUNTER — HOSPITAL ENCOUNTER (OUTPATIENT)
Facility: HOSPITAL | Age: 34
LOS: 1 days | Discharge: HOME | End: 2024-12-04
Attending: OTOLARYNGOLOGY | Admitting: OTOLARYNGOLOGY
Payer: COMMERCIAL

## 2024-12-02 DIAGNOSIS — G89.18 POST-OPERATIVE PAIN: ICD-10-CM

## 2024-12-02 DIAGNOSIS — K11.9 LESION OF PAROTID GLAND: Primary | ICD-10-CM

## 2024-12-02 PROBLEM — R13.10 DYSPHAGIA, IDIOPATHIC: Status: ACTIVE | Noted: 2024-12-02

## 2024-12-02 PROBLEM — F90.9 ADHD: Status: ACTIVE | Noted: 2024-12-02

## 2024-12-02 PROBLEM — G43.909 MIGRAINES: Status: ACTIVE | Noted: 2024-12-02

## 2024-12-02 LAB
ABO GROUP (TYPE) IN BLOOD: NORMAL
ALBUMIN SERPL BCP-MCNC: 4.4 G/DL (ref 3.4–5)
ANION GAP SERPL CALC-SCNC: 13 MMOL/L (ref 10–20)
ANTIBODY SCREEN: NORMAL
BUN SERPL-MCNC: 10 MG/DL (ref 6–23)
CALCIUM SERPL-MCNC: 9.4 MG/DL (ref 8.6–10.6)
CHLORIDE SERPL-SCNC: 103 MMOL/L (ref 98–107)
CO2 SERPL-SCNC: 25 MMOL/L (ref 21–32)
CREAT SERPL-MCNC: 0.92 MG/DL (ref 0.5–1.05)
EGFRCR SERPLBLD CKD-EPI 2021: 84 ML/MIN/1.73M*2
GLUCOSE SERPL-MCNC: 143 MG/DL (ref 74–99)
PHOSPHATE SERPL-MCNC: 2.8 MG/DL (ref 2.5–4.9)
POTASSIUM SERPL-SCNC: 4.4 MMOL/L (ref 3.5–5.3)
RH FACTOR (ANTIGEN D): NORMAL
SODIUM SERPL-SCNC: 137 MMOL/L (ref 136–145)

## 2024-12-02 PROCEDURE — 7100000001 HC RECOVERY ROOM TIME - INITIAL BASE CHARGE: Performed by: OTOLARYNGOLOGY

## 2024-12-02 PROCEDURE — 15769 GRFG AUTOL SOFT TISS DIR EXC: CPT | Performed by: OTOLARYNGOLOGY

## 2024-12-02 PROCEDURE — 3600000004 HC OR TIME - INITIAL BASE CHARGE - PROCEDURE LEVEL FOUR: Performed by: OTOLARYNGOLOGY

## 2024-12-02 PROCEDURE — 3700000002 HC GENERAL ANESTHESIA TIME - EACH INCREMENTAL 1 MINUTE: Performed by: OTOLARYNGOLOGY

## 2024-12-02 PROCEDURE — 86900 BLOOD TYPING SEROLOGIC ABO: CPT | Performed by: ANESTHESIOLOGY

## 2024-12-02 PROCEDURE — 96372 THER/PROPH/DIAG INJ SC/IM: CPT

## 2024-12-02 PROCEDURE — 36415 COLL VENOUS BLD VENIPUNCTURE: CPT

## 2024-12-02 PROCEDURE — 88341 IMHCHEM/IMCYTCHM EA ADD ANTB: CPT | Mod: TC,SUR | Performed by: OTOLARYNGOLOGY

## 2024-12-02 PROCEDURE — 36415 COLL VENOUS BLD VENIPUNCTURE: CPT | Performed by: ANESTHESIOLOGY

## 2024-12-02 PROCEDURE — 88312 SPECIAL STAINS GROUP 1: CPT | Mod: TC,SUR,59 | Performed by: OTOLARYNGOLOGY

## 2024-12-02 PROCEDURE — 2500000004 HC RX 250 GENERAL PHARMACY W/ HCPCS (ALT 636 FOR OP/ED)

## 2024-12-02 PROCEDURE — 80069 RENAL FUNCTION PANEL: CPT

## 2024-12-02 PROCEDURE — 42415 EXCISE PAROTID GLAND/LESION: CPT | Performed by: OTOLARYNGOLOGY

## 2024-12-02 PROCEDURE — 2500000004 HC RX 250 GENERAL PHARMACY W/ HCPCS (ALT 636 FOR OP/ED): Mod: SE

## 2024-12-02 PROCEDURE — 2500000005 HC RX 250 GENERAL PHARMACY W/O HCPCS: Mod: SE

## 2024-12-02 PROCEDURE — 2500000001 HC RX 250 WO HCPCS SELF ADMINISTERED DRUGS (ALT 637 FOR MEDICARE OP): Mod: SE

## 2024-12-02 PROCEDURE — 2500000005 HC RX 250 GENERAL PHARMACY W/O HCPCS: Performed by: OTOLARYNGOLOGY

## 2024-12-02 PROCEDURE — 7100000011 HC EXTENDED STAY RECOVERY HOURLY - NURSING UNIT

## 2024-12-02 PROCEDURE — 2500000005 HC RX 250 GENERAL PHARMACY W/O HCPCS

## 2024-12-02 PROCEDURE — 7100000002 HC RECOVERY ROOM TIME - EACH INCREMENTAL 1 MINUTE: Performed by: OTOLARYNGOLOGY

## 2024-12-02 PROCEDURE — 3600000009 HC OR TIME - EACH INCREMENTAL 1 MINUTE - PROCEDURE LEVEL FOUR: Performed by: OTOLARYNGOLOGY

## 2024-12-02 PROCEDURE — 3700000001 HC GENERAL ANESTHESIA TIME - INITIAL BASE CHARGE: Performed by: OTOLARYNGOLOGY

## 2024-12-02 PROCEDURE — 2720000007 HC OR 272 NO HCPCS: Performed by: OTOLARYNGOLOGY

## 2024-12-02 PROCEDURE — A42410 PR EXC PAROTD LESN,LATER LOBE: Performed by: ANESTHESIOLOGY

## 2024-12-02 PROCEDURE — 86901 BLOOD TYPING SEROLOGIC RH(D): CPT | Performed by: ANESTHESIOLOGY

## 2024-12-02 RX ORDER — HYDROMORPHONE HYDROCHLORIDE 1 MG/ML
0.2 INJECTION, SOLUTION INTRAMUSCULAR; INTRAVENOUS; SUBCUTANEOUS ONCE
Status: COMPLETED | OUTPATIENT
Start: 2024-12-02 | End: 2024-12-02

## 2024-12-02 RX ORDER — ONDANSETRON HYDROCHLORIDE 2 MG/ML
4 INJECTION, SOLUTION INTRAVENOUS ONCE AS NEEDED
Status: DISCONTINUED | OUTPATIENT
Start: 2024-12-02 | End: 2024-12-02 | Stop reason: HOSPADM

## 2024-12-02 RX ORDER — OXYCODONE HCL 5 MG/5 ML
5 SOLUTION, ORAL ORAL EVERY 4 HOURS PRN
Status: DISCONTINUED | OUTPATIENT
Start: 2024-12-02 | End: 2024-12-04 | Stop reason: HOSPADM

## 2024-12-02 RX ORDER — LABETALOL HYDROCHLORIDE 5 MG/ML
5 INJECTION, SOLUTION INTRAVENOUS ONCE AS NEEDED
Status: DISCONTINUED | OUTPATIENT
Start: 2024-12-02 | End: 2024-12-02 | Stop reason: HOSPADM

## 2024-12-02 RX ORDER — LIDOCAINE HYDROCHLORIDE 10 MG/ML
0.1 INJECTION, SOLUTION INFILTRATION; PERINEURAL ONCE
Status: DISCONTINUED | OUTPATIENT
Start: 2024-12-02 | End: 2024-12-02 | Stop reason: HOSPADM

## 2024-12-02 RX ORDER — ACETAMINOPHEN 325 MG/1
975 TABLET ORAL EVERY 8 HOURS SCHEDULED
Status: DISCONTINUED | OUTPATIENT
Start: 2024-12-02 | End: 2024-12-04 | Stop reason: HOSPADM

## 2024-12-02 RX ORDER — ENOXAPARIN SODIUM 100 MG/ML
40 INJECTION SUBCUTANEOUS EVERY 24 HOURS
Status: DISCONTINUED | OUTPATIENT
Start: 2024-12-02 | End: 2024-12-04 | Stop reason: HOSPADM

## 2024-12-02 RX ORDER — ROCURONIUM BROMIDE 10 MG/ML
INJECTION, SOLUTION INTRAVENOUS AS NEEDED
Status: DISCONTINUED | OUTPATIENT
Start: 2024-12-02 | End: 2024-12-02

## 2024-12-02 RX ORDER — SODIUM CHLORIDE, SODIUM LACTATE, POTASSIUM CHLORIDE, CALCIUM CHLORIDE 600; 310; 30; 20 MG/100ML; MG/100ML; MG/100ML; MG/100ML
100 INJECTION, SOLUTION INTRAVENOUS CONTINUOUS
Status: ACTIVE | OUTPATIENT
Start: 2024-12-02 | End: 2024-12-02

## 2024-12-02 RX ORDER — ACETAMINOPHEN 325 MG/1
650 TABLET ORAL EVERY 4 HOURS PRN
Status: DISCONTINUED | OUTPATIENT
Start: 2024-12-02 | End: 2024-12-02 | Stop reason: HOSPADM

## 2024-12-02 RX ORDER — FENTANYL CITRATE 50 UG/ML
25 INJECTION, SOLUTION INTRAMUSCULAR; INTRAVENOUS EVERY 5 MIN PRN
Status: DISCONTINUED | OUTPATIENT
Start: 2024-12-02 | End: 2024-12-02 | Stop reason: HOSPADM

## 2024-12-02 RX ORDER — POLYETHYLENE GLYCOL 3350 17 G/17G
17 POWDER, FOR SOLUTION ORAL DAILY
Status: DISCONTINUED | OUTPATIENT
Start: 2024-12-02 | End: 2024-12-04 | Stop reason: HOSPADM

## 2024-12-02 RX ORDER — MIDAZOLAM HYDROCHLORIDE 1 MG/ML
INJECTION, SOLUTION INTRAMUSCULAR; INTRAVENOUS AS NEEDED
Status: DISCONTINUED | OUTPATIENT
Start: 2024-12-02 | End: 2024-12-02

## 2024-12-02 RX ORDER — PHENYLEPHRINE HCL IN 0.9% NACL 0.4MG/10ML
SYRINGE (ML) INTRAVENOUS AS NEEDED
Status: DISCONTINUED | OUTPATIENT
Start: 2024-12-02 | End: 2024-12-02

## 2024-12-02 RX ORDER — ACETAMINOPHEN 325 MG/1
975 TABLET ORAL ONCE
Status: DISCONTINUED | OUTPATIENT
Start: 2024-12-02 | End: 2024-12-02 | Stop reason: HOSPADM

## 2024-12-02 RX ORDER — GLYCOPYRROLATE 0.2 MG/ML
INJECTION INTRAMUSCULAR; INTRAVENOUS AS NEEDED
Status: DISCONTINUED | OUTPATIENT
Start: 2024-12-02 | End: 2024-12-02

## 2024-12-02 RX ORDER — ONDANSETRON HYDROCHLORIDE 2 MG/ML
4 INJECTION, SOLUTION INTRAVENOUS EVERY 8 HOURS PRN
Status: DISCONTINUED | OUTPATIENT
Start: 2024-12-02 | End: 2024-12-04 | Stop reason: HOSPADM

## 2024-12-02 RX ORDER — ALBUTEROL SULFATE 0.83 MG/ML
2.5 SOLUTION RESPIRATORY (INHALATION) ONCE AS NEEDED
Status: DISCONTINUED | OUTPATIENT
Start: 2024-12-02 | End: 2024-12-02 | Stop reason: HOSPADM

## 2024-12-02 RX ORDER — LIDOCAINE HYDROCHLORIDE 20 MG/ML
INJECTION, SOLUTION INFILTRATION; PERINEURAL AS NEEDED
Status: DISCONTINUED | OUTPATIENT
Start: 2024-12-02 | End: 2024-12-02

## 2024-12-02 RX ORDER — SODIUM CHLORIDE 0.9 G/100ML
IRRIGANT IRRIGATION AS NEEDED
Status: DISCONTINUED | OUTPATIENT
Start: 2024-12-02 | End: 2024-12-02 | Stop reason: HOSPADM

## 2024-12-02 RX ORDER — PROPOFOL 10 MG/ML
INJECTION, EMULSION INTRAVENOUS CONTINUOUS PRN
Status: DISCONTINUED | OUTPATIENT
Start: 2024-12-02 | End: 2024-12-02

## 2024-12-02 RX ORDER — PROPOFOL 10 MG/ML
INJECTION, EMULSION INTRAVENOUS AS NEEDED
Status: DISCONTINUED | OUTPATIENT
Start: 2024-12-02 | End: 2024-12-02

## 2024-12-02 RX ORDER — ENOXAPARIN SODIUM 100 MG/ML
40 INJECTION SUBCUTANEOUS ONCE
Status: COMPLETED | OUTPATIENT
Start: 2024-12-02 | End: 2024-12-02

## 2024-12-02 RX ORDER — ACETAMINOPHEN 160 MG/5ML
1000 SOLUTION ORAL EVERY 8 HOURS SCHEDULED
Status: DISCONTINUED | OUTPATIENT
Start: 2024-12-02 | End: 2024-12-04 | Stop reason: HOSPADM

## 2024-12-02 RX ORDER — ONDANSETRON HYDROCHLORIDE 2 MG/ML
INJECTION, SOLUTION INTRAVENOUS AS NEEDED
Status: DISCONTINUED | OUTPATIENT
Start: 2024-12-02 | End: 2024-12-02

## 2024-12-02 RX ORDER — LIDOCAINE HYDROCHLORIDE 10 MG/ML
0.1 INJECTION, SOLUTION EPIDURAL; INFILTRATION; INTRACAUDAL; PERINEURAL ONCE
Status: DISCONTINUED | OUTPATIENT
Start: 2024-12-02 | End: 2024-12-02

## 2024-12-02 RX ORDER — BACITRACIN ZINC 500 UNIT/G
OINTMENT IN PACKET (EA) TOPICAL 3 TIMES DAILY
Status: DISCONTINUED | OUTPATIENT
Start: 2024-12-02 | End: 2024-12-04 | Stop reason: HOSPADM

## 2024-12-02 RX ORDER — ONDANSETRON 4 MG/1
4 TABLET, FILM COATED ORAL EVERY 8 HOURS PRN
Status: DISCONTINUED | OUTPATIENT
Start: 2024-12-02 | End: 2024-12-04 | Stop reason: HOSPADM

## 2024-12-02 RX ORDER — NALOXONE HYDROCHLORIDE 0.4 MG/ML
0.2 INJECTION, SOLUTION INTRAMUSCULAR; INTRAVENOUS; SUBCUTANEOUS EVERY 5 MIN PRN
Status: DISCONTINUED | OUTPATIENT
Start: 2024-12-02 | End: 2024-12-04 | Stop reason: HOSPADM

## 2024-12-02 SDOH — SOCIAL STABILITY: SOCIAL INSECURITY: DOES ANYONE TRY TO KEEP YOU FROM HAVING/CONTACTING OTHER FRIENDS OR DOING THINGS OUTSIDE YOUR HOME?: NO

## 2024-12-02 SDOH — SOCIAL STABILITY: SOCIAL INSECURITY: HAS ANYONE EVER THREATENED TO HURT YOUR FAMILY OR YOUR PETS?: NO

## 2024-12-02 SDOH — ECONOMIC STABILITY: TRANSPORTATION INSECURITY
IN THE PAST 12 MONTHS, HAS LACK OF TRANSPORTATION KEPT YOU FROM MEDICAL APPOINTMENTS OR FROM GETTING MEDICATIONS?: PATIENT DECLINED

## 2024-12-02 SDOH — ECONOMIC STABILITY: FOOD INSECURITY: HOW HARD IS IT FOR YOU TO PAY FOR THE VERY BASICS LIKE FOOD, HOUSING, MEDICAL CARE, AND HEATING?: PATIENT DECLINED

## 2024-12-02 SDOH — SOCIAL STABILITY: SOCIAL INSECURITY: WERE YOU ABLE TO COMPLETE ALL THE BEHAVIORAL HEALTH SCREENINGS?: YES

## 2024-12-02 SDOH — ECONOMIC STABILITY: HOUSING INSECURITY: IN THE LAST 12 MONTHS, WAS THERE A TIME WHEN YOU WERE NOT ABLE TO PAY THE MORTGAGE OR RENT ON TIME?: PATIENT DECLINED

## 2024-12-02 SDOH — SOCIAL STABILITY: SOCIAL INSECURITY: HAVE YOU HAD ANY THOUGHTS OF HARMING ANYONE ELSE?: NO

## 2024-12-02 SDOH — ECONOMIC STABILITY: FOOD INSECURITY: WITHIN THE PAST 12 MONTHS, THE FOOD YOU BOUGHT JUST DIDN'T LAST AND YOU DIDN'T HAVE MONEY TO GET MORE.: PATIENT DECLINED

## 2024-12-02 SDOH — SOCIAL STABILITY: SOCIAL INSECURITY: ARE YOU OR HAVE YOU BEEN THREATENED OR ABUSED PHYSICALLY, EMOTIONALLY, OR SEXUALLY BY ANYONE?: NO

## 2024-12-02 SDOH — ECONOMIC STABILITY: HOUSING INSECURITY: AT ANY TIME IN THE PAST 12 MONTHS, WERE YOU HOMELESS OR LIVING IN A SHELTER (INCLUDING NOW)?: NO

## 2024-12-02 SDOH — ECONOMIC STABILITY: FOOD INSECURITY
WITHIN THE PAST 12 MONTHS, YOU WORRIED THAT YOUR FOOD WOULD RUN OUT BEFORE YOU GOT THE MONEY TO BUY MORE.: PATIENT DECLINED

## 2024-12-02 SDOH — SOCIAL STABILITY: SOCIAL INSECURITY: DO YOU FEEL ANYONE HAS EXPLOITED OR TAKEN ADVANTAGE OF YOU FINANCIALLY OR OF YOUR PERSONAL PROPERTY?: NO

## 2024-12-02 SDOH — SOCIAL STABILITY: SOCIAL INSECURITY: HAVE YOU HAD THOUGHTS OF HARMING ANYONE ELSE?: NO

## 2024-12-02 SDOH — SOCIAL STABILITY: SOCIAL INSECURITY: DO YOU FEEL UNSAFE GOING BACK TO THE PLACE WHERE YOU ARE LIVING?: NO

## 2024-12-02 SDOH — ECONOMIC STABILITY: HOUSING INSECURITY: AT ANY TIME IN THE PAST 12 MONTHS, WERE YOU HOMELESS OR LIVING IN A SHELTER (INCLUDING NOW)?: PATIENT DECLINED

## 2024-12-02 SDOH — SOCIAL STABILITY: SOCIAL INSECURITY: ABUSE: ADULT

## 2024-12-02 SDOH — SOCIAL STABILITY: SOCIAL INSECURITY: ARE THERE ANY APPARENT SIGNS OF INJURIES/BEHAVIORS THAT COULD BE RELATED TO ABUSE/NEGLECT?: NO

## 2024-12-02 ASSESSMENT — PATIENT HEALTH QUESTIONNAIRE - PHQ9
SUM OF ALL RESPONSES TO PHQ9 QUESTIONS 1 & 2: 0
2. FEELING DOWN, DEPRESSED OR HOPELESS: NOT AT ALL
1. LITTLE INTEREST OR PLEASURE IN DOING THINGS: NOT AT ALL

## 2024-12-02 ASSESSMENT — PAIN - FUNCTIONAL ASSESSMENT
PAIN_FUNCTIONAL_ASSESSMENT: 0-10

## 2024-12-02 ASSESSMENT — COGNITIVE AND FUNCTIONAL STATUS - GENERAL
PATIENT BASELINE BEDBOUND: NO
MOBILITY SCORE: 24
DAILY ACTIVITIY SCORE: 24
MOBILITY SCORE: 24
DAILY ACTIVITIY SCORE: 24

## 2024-12-02 ASSESSMENT — ACTIVITIES OF DAILY LIVING (ADL)
WALKS IN HOME: INDEPENDENT
GROOMING: INDEPENDENT
HEARING - RIGHT EAR: FUNCTIONAL
FEEDING YOURSELF: INDEPENDENT
BATHING: INDEPENDENT
DRESSING YOURSELF: INDEPENDENT
ASSISTIVE_DEVICE: EYEGLASSES
TOILETING: INDEPENDENT
HEARING - LEFT EAR: FUNCTIONAL
JUDGMENT_ADEQUATE_SAFELY_COMPLETE_DAILY_ACTIVITIES: YES
PATIENT'S MEMORY ADEQUATE TO SAFELY COMPLETE DAILY ACTIVITIES?: YES
ADEQUATE_TO_COMPLETE_ADL: YES
LACK_OF_TRANSPORTATION: PATIENT DECLINED

## 2024-12-02 ASSESSMENT — LIFESTYLE VARIABLES
AUDIT-C TOTAL SCORE: 2
HOW OFTEN DO YOU HAVE A DRINK CONTAINING ALCOHOL: MONTHLY OR LESS
HOW OFTEN DO YOU HAVE 6 OR MORE DRINKS ON ONE OCCASION: LESS THAN MONTHLY
SUBSTANCE_ABUSE_PAST_12_MONTHS: NO
SKIP TO QUESTIONS 9-10: 0
PRESCIPTION_ABUSE_PAST_12_MONTHS: NO
HOW MANY STANDARD DRINKS CONTAINING ALCOHOL DO YOU HAVE ON A TYPICAL DAY: 1 OR 2
AUDIT-C TOTAL SCORE: 2

## 2024-12-02 ASSESSMENT — PAIN DESCRIPTION - LOCATION
LOCATION: NECK
LOCATION: NECK

## 2024-12-02 ASSESSMENT — PAIN SCALES - GENERAL
PAINLEVEL_OUTOF10: 10 - WORST POSSIBLE PAIN
PAINLEVEL_OUTOF10: 10 - WORST POSSIBLE PAIN
PAINLEVEL_OUTOF10: 2
PAINLEVEL_OUTOF10: 9
PAINLEVEL_OUTOF10: 8
PAINLEVEL_OUTOF10: 10 - WORST POSSIBLE PAIN
PAINLEVEL_OUTOF10: 4
PAINLEVEL_OUTOF10: 0 - NO PAIN
PAINLEVEL_OUTOF10: 8
PAIN_LEVEL: 5
PAINLEVEL_OUTOF10: 2

## 2024-12-02 ASSESSMENT — COLUMBIA-SUICIDE SEVERITY RATING SCALE - C-SSRS
1. IN THE PAST MONTH, HAVE YOU WISHED YOU WERE DEAD OR WISHED YOU COULD GO TO SLEEP AND NOT WAKE UP?: NO
2. HAVE YOU ACTUALLY HAD ANY THOUGHTS OF KILLING YOURSELF?: NO
6. HAVE YOU EVER DONE ANYTHING, STARTED TO DO ANYTHING, OR PREPARED TO DO ANYTHING TO END YOUR LIFE?: NO

## 2024-12-02 NOTE — ANESTHESIA PROCEDURE NOTES
Airway  Date/Time: 12/2/2024 12:23 PM  Urgency: elective    Airway not difficult    Staffing  Performed: resident   Authorized by: Graciela Hendrickson MD    Performed by: Rivas Willis MD  Patient location during procedure: OR    Indications and Patient Condition  Indications for airway management: anesthesia  Spontaneous Ventilation: absent  Sedation level: deep  Preoxygenated: yes  Patient position: sniffing  MILS maintained throughout  Mask difficulty assessment: 1 - vent by mask    Final Airway Details  Final airway type: endotracheal airway      Successful airway: ETT  Cuffed: yes   Successful intubation technique: direct laryngoscopy  Facilitating devices/methods: intubating stylet  Endotracheal tube insertion site: oral  Blade: Frederic  Blade size: #3  ETT size (mm): 6.0  Cormack-Lehane Classification: grade IIa - partial view of glottis  Placement verified by: chest auscultation   Measured from: lips  ETT to lips (cm): 22  Number of attempts at approach: 1

## 2024-12-02 NOTE — ANESTHESIA PREPROCEDURE EVALUATION
Patient: Huong Carlos    Procedure Information       Date/Time: 12/02/24 1100    Procedures:       Parotidectomy (Right)      Excision Adipose Tissue Graft Torso - possible abdominal fat graft    Location: [facility] OR 04 / [facility] [place]    Surgeons: Benedict Almeida MD            Relevant Problems   Anesthesia (within normal limits)      Cardiac (within normal limits)      Pulmonary (within normal limits)      Neuro   (+) Migraines      GI  Intermittent dysphagia per notes   (+) Dysphagia, idiopathic      ENT   (+) Lesion of parotid gland      Mental Health   (+) ADHD       Clinical information reviewed:   Tobacco  Allergies  Meds   Med Hx  Surg Hx   Fam Hx  Soc Hx        NPO Detail:  NPO/Void Status  Date of Last Liquid: 12/02/24  Time of Last Liquid: 0640  Date of Last Solid: 12/02/24  Time of Last Solid: 0000         Physical Exam    Airway  Mallampati: I  TM distance: >3 FB  Neck ROM: full     Cardiovascular   Rhythm: regular  Rate: normal     Dental    Pulmonary   Breath sounds clear to auscultation     Abdominal      Other findings: Very anxious preop.         Anesthesia Plan    History of general anesthesia?: yes  History of complications of general anesthesia?: no    ASA 2     general   (GA ETT  Videolaryngoscope avail)  intravenous induction   Anesthetic plan and risks discussed with patient and spouse.  Use of blood products discussed with patient who.

## 2024-12-02 NOTE — NURSING NOTE
Notified the ent team of the new swelling to the patients RLQ. Nursing staff is to watch and make sure the swelling doesn't get larger.

## 2024-12-02 NOTE — ANESTHESIA POSTPROCEDURE EVALUATION
Patient: Huong Carlos    Procedure Summary       Date: 12/02/24 Room / Location: [facility] OR 04 / [facility]    Anesthesia Start: 1207 Anesthesia Stop: 1417    Procedures:       Parotidectomy (Right)      Excision Adipose Tissue Graft Torso (Right) Diagnosis:       Lesion of parotid gland      (Lesion of parotid gland [K11.9])    Surgeons: Benedict Almeida MD Responsible Provider: Graciela Hendrickson MD    Anesthesia Type: general ASA Status: 2            Anesthesia Type: general    Vitals Value Taken Time   BP  12/02/24 1418   Temp 36.6 12/02/24 1418   Pulse 107 12/02/24 1416   Resp 20 12/02/24 1416   SpO2 100 % 12/02/24 1416   Vitals shown include unfiled device data.    Anesthesia Post Evaluation    Patient location during evaluation: PACU  Patient participation: complete - patient participated  Level of consciousness: awake  Pain score: 5  Pain management: adequate  Airway patency: patent  Two or more strategies used to mitigate risk of obstructive sleep apnea  Cardiovascular status: acceptable  Respiratory status: acceptable  Hydration status: acceptable  Postoperative Nausea and Vomiting: none        No notable events documented.

## 2024-12-03 LAB
ALBUMIN SERPL BCP-MCNC: 3.7 G/DL (ref 3.4–5)
ANION GAP SERPL CALC-SCNC: 13 MMOL/L (ref 10–20)
BUN SERPL-MCNC: 10 MG/DL (ref 6–23)
CALCIUM SERPL-MCNC: 9 MG/DL (ref 8.6–10.6)
CHLORIDE SERPL-SCNC: 104 MMOL/L (ref 98–107)
CO2 SERPL-SCNC: 24 MMOL/L (ref 21–32)
CREAT SERPL-MCNC: 0.87 MG/DL (ref 0.5–1.05)
EGFRCR SERPLBLD CKD-EPI 2021: 90 ML/MIN/1.73M*2
ERYTHROCYTE [DISTWIDTH] IN BLOOD BY AUTOMATED COUNT: 12.1 % (ref 11.5–14.5)
GLUCOSE SERPL-MCNC: 106 MG/DL (ref 74–99)
HCT VFR BLD AUTO: 38 % (ref 36–46)
HGB BLD-MCNC: 12.9 G/DL (ref 12–16)
MCH RBC QN AUTO: 31.2 PG (ref 26–34)
MCHC RBC AUTO-ENTMCNC: 33.9 G/DL (ref 32–36)
MCV RBC AUTO: 92 FL (ref 80–100)
NRBC BLD-RTO: 0 /100 WBCS (ref 0–0)
PHOSPHATE SERPL-MCNC: 4.4 MG/DL (ref 2.5–4.9)
PLATELET # BLD AUTO: 279 X10*3/UL (ref 150–450)
POTASSIUM SERPL-SCNC: 4.2 MMOL/L (ref 3.5–5.3)
RBC # BLD AUTO: 4.13 X10*6/UL (ref 4–5.2)
SODIUM SERPL-SCNC: 137 MMOL/L (ref 136–145)
WBC # BLD AUTO: 11.2 X10*3/UL (ref 4.4–11.3)

## 2024-12-03 PROCEDURE — 2500000004 HC RX 250 GENERAL PHARMACY W/ HCPCS (ALT 636 FOR OP/ED): Mod: SE

## 2024-12-03 PROCEDURE — 2500000001 HC RX 250 WO HCPCS SELF ADMINISTERED DRUGS (ALT 637 FOR MEDICARE OP): Mod: SE

## 2024-12-03 PROCEDURE — 36415 COLL VENOUS BLD VENIPUNCTURE: CPT

## 2024-12-03 PROCEDURE — 80069 RENAL FUNCTION PANEL: CPT

## 2024-12-03 PROCEDURE — 85027 COMPLETE CBC AUTOMATED: CPT

## 2024-12-03 PROCEDURE — 96372 THER/PROPH/DIAG INJ SC/IM: CPT

## 2024-12-03 PROCEDURE — 7100000011 HC EXTENDED STAY RECOVERY HOURLY - NURSING UNIT

## 2024-12-03 RX ORDER — OXYCODONE HYDROCHLORIDE 5 MG/1
5 TABLET ORAL ONCE
Status: COMPLETED | OUTPATIENT
Start: 2024-12-03 | End: 2024-12-03

## 2024-12-03 RX ORDER — OXYCODONE HYDROCHLORIDE 5 MG/1
10 TABLET ORAL EVERY 4 HOURS PRN
Status: DISCONTINUED | OUTPATIENT
Start: 2024-12-03 | End: 2024-12-04 | Stop reason: HOSPADM

## 2024-12-03 RX ORDER — HYDROMORPHONE HYDROCHLORIDE 1 MG/ML
0.2 INJECTION, SOLUTION INTRAMUSCULAR; INTRAVENOUS; SUBCUTANEOUS ONCE
Status: COMPLETED | OUTPATIENT
Start: 2024-12-03 | End: 2024-12-03

## 2024-12-03 RX ORDER — CEFAZOLIN SODIUM 2 G/100ML
2 INJECTION, SOLUTION INTRAVENOUS EVERY 8 HOURS
Status: COMPLETED | OUTPATIENT
Start: 2024-12-03 | End: 2024-12-04

## 2024-12-03 RX ORDER — LORAZEPAM 0.5 MG/1
0.5 TABLET ORAL ONCE
Status: COMPLETED | OUTPATIENT
Start: 2024-12-03 | End: 2024-12-03

## 2024-12-03 ASSESSMENT — COGNITIVE AND FUNCTIONAL STATUS - GENERAL
DAILY ACTIVITIY SCORE: 24
MOBILITY SCORE: 24

## 2024-12-03 ASSESSMENT — PAIN SCALES - GENERAL
PAINLEVEL_OUTOF10: 6
PAINLEVEL_OUTOF10: 4
PAINLEVEL_OUTOF10: 6
PAINLEVEL_OUTOF10: 6
PAINLEVEL_OUTOF10: 4
PAINLEVEL_OUTOF10: 6
PAINLEVEL_OUTOF10: 4

## 2024-12-03 ASSESSMENT — PAIN SCALES - PAIN ASSESSMENT IN ADVANCED DEMENTIA (PAINAD): TOTALSCORE: MEDICATION (SEE MAR)

## 2024-12-03 ASSESSMENT — PAIN DESCRIPTION - LOCATION: LOCATION: ABDOMEN

## 2024-12-03 NOTE — PROGRESS NOTES
Pharmacy Medication History Review    Huong Carlos is a 34 y.o. female admitted for Lesion of parotid gland. Pharmacy reviewed the patient's [id number] medications and allergies for accuracy.    The list below reflects the updated PTA list.   Prior to Admission Medications   Prescriptions Last Dose Informant   SUMAtriptan (Imitrex) 100 mg tablet  Self   Sig: Take 1 tablet (100 mg) by mouth once daily as needed.   chlorhexidine (Hibiclens) 4 % external liquid  Self   Sig: Use as directed daily preoperatively   chlorhexidine (Peridex) 0.12 % solution  Self   Sig: Swish and spit with 15ml of solution the night before and morning of surgery. Do not swallow.   cyanocobalamin (Vitamin B-12) 1,000 mcg/mL injection  Self   Si mL Injection once a month for 90 days   ergocalciferol (Vitamin D-2) 1.25 MG (62450 UT) capsule  Self   Si capsule (50,000 Units) 2 times a week.      Facility-Administered Medications: None        The list below reflects the updated allergy list. Please review each documented allergy for additional clarification and justification.  Allergies  Reviewed by Margarito Ngo RN on 2024        Severity Reactions Comments    Aspirin High Anaphylaxis, Unknown Other Reaction(s): Anaphylactic reaction to food    Clonidine High Unknown, GI Upset Other Reaction(s): extreme fatigue    Ibuprofen High Anaphylaxis, GI bleeding, Unknown Other Reaction(s): Anaphylactic reaction to food    Tramadol High Anaphylaxis, Nausea And Vomiting, GI Upset, Unknown, Nausea/vomiting             Patient accepts M2B at discharge. Pharmacy has been updated to [facility].    Sources used to complete the med history include:    [facility]  Pharmacy dispense history  Patient interview Good historian  Chart Review  Care Everywhere   Pre admission testing note 24     Below are additional concerns with the patient's PTA list.      Medications ADDED:  none  Medications CHANGED:  none  Medications REMOVED:    none    [facility].   Transitions of Care Pharmacist  [facility]  Please reach out via Secure Chat for questions, or if no response call [phone] or vocera [facility]

## 2024-12-03 NOTE — PROGRESS NOTES
ENT DAILY PROGRESS NOTE  Name: Huong Carlos  MRN: [id number]  : 1990    Identification Statement  The patient is a 34 y.o. female who underwent a right parotidectomy with abdominal fat graft on 2024    Subjective  A hematoma formed at the abdominal graft site overnight requiring aspiration.  Pain is tolerated with oral medications  Tolerating regular diet    Objective  Temp:  [36.1 °C (97 °F)-36.7 °C (98.1 °F)] 36.5 °C (97.7 °F)  Heart Rate:  [] 85  Resp:  [11-41] 16  BP: ()/(66-86) 104/70  Gen: Alert, oriented, no acute distress  Resp: Breathing comfortably on room air, no stridor  Head: Atraumatic, normocephalic  Oral Cavity: MMM,  Neuro: Slight marginal nerve weakness on right side  Ears: Normal external ears   Nose: External nose midline   Neck: Incision clean dry and intact with minimal to no surrounding erythema/edema  Abdomen: See procedure note below  Drains: In place and holding suction with serosanguineous fluid.  Stripped at bedside.    Procedure note: I&D:  There is evidence of 3-4 cm firm hematoma at the abdominal fat grafting incision site.  It is tender to palpation.  The patient was premedicated with Dilaudid and Ativan.  Verbal consent was obtained by the patient.  The patient was injected with 1: 100,000 mL of lidocaine with epinephrine surrounding the incision site.  After adequate time was given for the anesthetic to take effect, iris scissors were used to open a small portion of the incision.  The hematoma was evacuated using Kellee clamps and pressure.  A 1/4 inch Penrose drain was placed at the incision site and secured in place with 2-0 silk suture.  The site was covered with a pressure dressing.  Abdominal binder was secured.  This concluded the procedure.    Intake/Output Summary (Last 24 hours) at 12/3/2024 0839  Last data filed at 12/3/2024 0809  Gross per 24 hour   Intake 985.21 ml   Output 37.5 ml   Net 947.71 ml       Labs    Assessment  The patient is a 34  y.o. female who underwent a right parotidectomy with abdominal fat graft on 12/2/2024    Active Problems:  -Right sided parotidectomy with abdominal fat graft secondary to pleomorphic adenoma  - Hematoma    Plan:  -Drains: Monitor output  - Analgesia: 0.2 mg Dilaudid before and after incision and drainage procedure.  As needed oxycodone every 4 hours.  - GI: Bowel regimen: As needed antiemetic  - : Voiding spontaneously; no bowel movement-patient refused laxative  - FEN: Regular diet  - Incision care: Bacitracin ointment twice daily until 12/4/2024.  Transition to Vaseline twice daily  - Embolic PPx: SQH, SCDs while in bed  - Dispo: continued care on SCC5      Mateo Jeffries,  PGY-3  I am the day resident. I can only be contacted from 6 AM to 5 PM. Page on weekends or off hours.   Otolaryngology - Head & Neck Surgery  ENT Consult pager: [phone]  Peds pager: [phone]  Adult Head & Neck Phone: [phone]  ENT subspecialty team: Lois individual resident who wrote today's note  Please page if urgent

## 2024-12-04 VITALS
TEMPERATURE: 97.2 F | RESPIRATION RATE: 16 BRPM | HEIGHT: 66 IN | OXYGEN SATURATION: 100 % | DIASTOLIC BLOOD PRESSURE: 63 MMHG | WEIGHT: 154.54 LBS | SYSTOLIC BLOOD PRESSURE: 103 MMHG | HEART RATE: 69 BPM | BODY MASS INDEX: 24.84 KG/M2

## 2024-12-04 LAB
ALBUMIN SERPL BCP-MCNC: 3.6 G/DL (ref 3.4–5)
ANION GAP SERPL CALC-SCNC: 9 MMOL/L (ref 10–20)
BUN SERPL-MCNC: 11 MG/DL (ref 6–23)
CALCIUM SERPL-MCNC: 8.9 MG/DL (ref 8.6–10.6)
CHLORIDE SERPL-SCNC: 104 MMOL/L (ref 98–107)
CO2 SERPL-SCNC: 29 MMOL/L (ref 21–32)
CREAT SERPL-MCNC: 0.94 MG/DL (ref 0.5–1.05)
EGFRCR SERPLBLD CKD-EPI 2021: 82 ML/MIN/1.73M*2
ERYTHROCYTE [DISTWIDTH] IN BLOOD BY AUTOMATED COUNT: 12.5 % (ref 11.5–14.5)
GLUCOSE SERPL-MCNC: 86 MG/DL (ref 74–99)
HCT VFR BLD AUTO: 35.1 % (ref 36–46)
HGB BLD-MCNC: 11.6 G/DL (ref 12–16)
MCH RBC QN AUTO: 30.9 PG (ref 26–34)
MCHC RBC AUTO-ENTMCNC: 33 G/DL (ref 32–36)
MCV RBC AUTO: 94 FL (ref 80–100)
NRBC BLD-RTO: 0 /100 WBCS (ref 0–0)
PHOSPHATE SERPL-MCNC: 3.6 MG/DL (ref 2.5–4.9)
PLATELET # BLD AUTO: 199 X10*3/UL (ref 150–450)
POTASSIUM SERPL-SCNC: 3.8 MMOL/L (ref 3.5–5.3)
RBC # BLD AUTO: 3.75 X10*6/UL (ref 4–5.2)
SODIUM SERPL-SCNC: 138 MMOL/L (ref 136–145)
WBC # BLD AUTO: 4.3 X10*3/UL (ref 4.4–11.3)

## 2024-12-04 PROCEDURE — 2500000004 HC RX 250 GENERAL PHARMACY W/ HCPCS (ALT 636 FOR OP/ED): Mod: JZ,SE

## 2024-12-04 PROCEDURE — 36415 COLL VENOUS BLD VENIPUNCTURE: CPT

## 2024-12-04 PROCEDURE — 2500000005 HC RX 250 GENERAL PHARMACY W/O HCPCS: Mod: SE

## 2024-12-04 PROCEDURE — 85027 COMPLETE CBC AUTOMATED: CPT

## 2024-12-04 PROCEDURE — 80069 RENAL FUNCTION PANEL: CPT

## 2024-12-04 PROCEDURE — 2500000001 HC RX 250 WO HCPCS SELF ADMINISTERED DRUGS (ALT 637 FOR MEDICARE OP): Mod: SE

## 2024-12-04 PROCEDURE — 7100000011 HC EXTENDED STAY RECOVERY HOURLY - NURSING UNIT

## 2024-12-04 RX ORDER — OXYCODONE HYDROCHLORIDE 5 MG/1
5 TABLET ORAL EVERY 6 HOURS PRN
Qty: 15 TABLET | Refills: 0 | Status: SHIPPED | OUTPATIENT
Start: 2024-12-04 | End: 2024-12-09

## 2024-12-04 RX ORDER — ACETAMINOPHEN 500 MG
1000 TABLET ORAL EVERY 8 HOURS PRN
Qty: 30 TABLET | Refills: 0 | Status: SHIPPED | OUTPATIENT
Start: 2024-12-04 | End: 2024-12-09

## 2024-12-04 RX ORDER — DIPHENHYDRAMINE HCL 25 MG
25 CAPSULE ORAL ONCE
Status: COMPLETED | OUTPATIENT
Start: 2024-12-04 | End: 2024-12-04

## 2024-12-04 RX ORDER — CEPHALEXIN 500 MG/1
500 CAPSULE ORAL 3 TIMES DAILY
Qty: 15 CAPSULE | Refills: 0 | Status: SHIPPED | OUTPATIENT
Start: 2024-12-04 | End: 2024-12-09

## 2024-12-04 ASSESSMENT — COGNITIVE AND FUNCTIONAL STATUS - GENERAL
MOBILITY SCORE: 24
DAILY ACTIVITIY SCORE: 24
DAILY ACTIVITIY SCORE: 24
MOBILITY SCORE: 24

## 2024-12-04 ASSESSMENT — PAIN SCALES - GENERAL
PAINLEVEL_OUTOF10: 6
PAINLEVEL_OUTOF10: 7

## 2024-12-04 NOTE — CARE PLAN
The patient's goals for the shift include      The clinical goals for the shift include pt will have adequate pain control

## 2024-12-04 NOTE — HOSPITAL COURSE
Huong Carlos is a 34 y.o. female with right parotid lesion, who presented for right parotidectomy with abdominal fat graft by Dr Almeida on 12/2/2024. Patient had an uncomplicated surgical course. Patient recovered in PACU and was transferred to [place] [id number] for post-operative care.    On postoperative day 1, patient developed a hematoma at the site of her abdominal fat grafting site.  Incision and drainage of the area was performed.  Penrose drain was placed and the patient wore an abdominal binder until the time of discharge.  There is no evidence of reaccumulation.    On day of discharge, post-operative pain was well controlled with enteral pain medication, breathing on room air, voiding spontaneously ambulating well, and was tolerating a diet.  The patient's right parotid drain was removed.  The Penrose drain in the abdominal fat grafting site was removed and the site was closed with 5-0 fast absorbing gut. Follow-up arranged.

## 2024-12-04 NOTE — PROGRESS NOTES
ENT DAILY PROGRESS NOTE  Name: Huong Carlos  MRN: [id number]  : 1990    Identification Statement  The patient is a 34 y.o. female who underwent a right parotidectomy with abdominal fat graft on 2024    Subjective  No acute events overnight.    Hematoma removed at bedside yesterday.  Penrose dressing placed at abdominal fat grafting site.  Minimal discharge from site over last 24 hours.  Pain is tolerated with oral medications  Tolerating regular diet    Objective  Temp:  [36.1 °C (97 °F)-36.2 °C (97.2 °F)] 36.2 °C (97.2 °F)  Heart Rate:  [69-86] 69  Resp:  [16-18] 16  BP: (103-124)/(63-75) 103/63  Gen: Alert, oriented, no acute distress  Resp: Breathing comfortably on room air, no stridor  Head: Atraumatic, normocephalic  Oral Cavity: MMM,  Neuro: Slight marginal nerve weakness on right side  Ears: Normal external ears   Nose: External nose midline   Neck: Incision clean dry and intact with minimal to no surrounding erythema/edema  Abdomen: See procedure note below  Drains: In place and holding suction with serosanguineous fluid.  Stripped at bedside.  Right neck drain removed.  Penrose drain removed from abdominal fat grafting site.  5-0 fast used to reapproximate wound edges.  Portion of incision left open to allow any further drainage.      Intake/Output Summary (Last 24 hours) at 2024 1148  Last data filed at 2024 0907  Gross per 24 hour   Intake 350 ml   Output 10 ml   Net 340 ml       Labs    Assessment  The patient is a 34 y.o. 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  - Analgesia: As needed oxycodone every 4 hours.  - GI: Bowel regimen: As needed antiemetic  - : Voiding spontaneously  - FEN: Regular diet  - Incision care: Bacitracin ointment twice daily until 2024.  Transition to Vaseline  twice daily  - Embolic PPx: SQH, SCDs while in bed  - Dispo: Discharge home today-12/4/2024    Mateo Jeffries,  PGY-3  I am the day resident. I can only be contacted from 6 AM to 5 PM. Page on weekends or off hours.   Otolaryngology - Head & Neck Surgery  ENT Consult pager: [phone]  Peds pager: [phone]  Adult Head & Neck Phone: [phone]  ENT subspecialty team: Lois individual resident who wrote today's note  Please page if urgent

## 2024-12-04 NOTE — DISCHARGE INSTRUCTIONS
Discharge Instructions        Huong Laniernhart  [id number]    Physicians:   Dr. Almeida     Operations performed while hospitalized:   1.right parotidectomy with abdominal fat graft    Treatment/wound care:   Keep area(s) clean and dry.   It is okay to shower 48 hours after time of surgery.    Do not scrub wound(s), pat dry.    Do not submerge wound(s) in standing water until seen in followup (no tub bathing, swimming, or hot tubs).    Please visually inspect your wound(s) at least once daily.  If the wound(s) are in a difficult to see location, please use a mirror or have someone else assist with visual inspection.    If you have sutures that you can see outside of the skin or staples:  Please have staples/sutures removed in our clinic 10-14 days after the date of surgery.  Do not remove the staples/sutures on your own.  Return sooner or call if wound(s) or surrounding area have increased swelling, pain, warmth, redness, or drainage that is thick, yellow and/or green.    If you see white bandages on your neck:  You have steri strips (small paper tape) along your incision. You can shower, pat dry; do not soak in a tub.  The steri strips will fall off on their own; do not peel them off.    Pain Control:    Adequate management: Alternate taking either acetaminophen or ibuprofen every 3 hours as needed for pain. For example, take acetaminophen at 9:00, followed by ibuprofen at 12:00, followed by acetaminophen at 3:00, etc.   Oxycodone 5 mg can be taken as prescribed as needed for breakthrough pain.    Oxycodone 5 mg is a narcotic, which can induce constipation.   Please take stool softeners when taking this medication to prevent constipation.    Activity after Discharge:   No heavy lifting, weight bearing as tolerated, no driving until mobility is returned to normal.   Do not submerge wound(s) in standing water for 7 days after surgery (no tub bathing, swimming, or hot tubs).   Do not drive or operate heavy machinery  while taking narcotic pain medications as these medications can alter perception, impair judgement, and slow reaction times.    Diet:   Regular diet    Call Provider if:  Breathing faster than normal  Breathing harder than normal or having retractions  Fever of 100.4 (38 C) or higher  Temperature is greater than 102 degrees  Chills  Drinking less than normal  Not being able to go 4-6 hours between albuterol treatments  Urinating less than normal, over 1 day  Acting very sleepy and difficult to awaken  Vomiting (throwing up) and not able to eat or drink for 12 hours  3 or more loose, watery bowel movements in 24 hours (diarrhea)  Any new concerning symptoms    Additional Instructions:   Please apply Vaseline over your incisions twice daily for the next 2 weeks.

## 2024-12-04 NOTE — DISCHARGE SUMMARY
Discharge Diagnosis  Lesion of parotid gland    Issues Requiring Follow-Up  Right parotidectomy with abdominal fat grafting    Test Results Pending At Discharge  Pending Labs       Order Current Status    Surgical Pathology Exam In process            Hospital Course  Huong Carlos is a 34 y.o. female with right parotid lesion, who presented for right parotidectomy with abdominal fat graft by Dr Almeida on 12/2/2024. Patient had an uncomplicated surgical course. Patient recovered in PACU and was transferred to [place] [address] for post-operative care.    On postoperative day 1, patient developed a hematoma at the site of her abdominal fat grafting site.  Incision and drainage of the area was performed.  Penrose drain was placed and the patient wore an abdominal binder until the time of discharge.  There is no evidence of reaccumulation.    On day of discharge, post-operative pain was well controlled with enteral pain medication, breathing on room air, voiding spontaneously ambulating well, and was tolerating a diet.  The patient's right parotid drain was removed.  The Penrose drain in the abdominal fat grafting site was removed and the site was closed with 5-0 fast absorbing gut. Follow-up arranged.     Pertinent Physical Exam At Time of Discharge  Physical Exam  Please refer to 12/4/2024 progress note.    Home Medications     Medication List      START taking these medications     acetaminophen 500 mg tablet; Commonly known as: Tylenol Extra Strength;   Take 2 tablets (1,000 mg) by mouth every 8 hours if needed for mild pain   (1 - 3) for up to 5 days.   cephalexin 500 mg capsule; Commonly known as: Keflex; Take 1 capsule   (500 mg) by mouth 3 times a day for 5 days.   oxyCODONE 5 mg immediate release tablet; Commonly known as: Roxicodone;   Take 1 tablet (5 mg) by mouth every 6 hours if needed for severe pain (7 -   10) for up to 5 days.     CONTINUE taking these medications     cyanocobalamin 1,000  mcg/mL injection; Commonly known as: Vitamin B-12   ergocalciferol 1.25 MG (51011 UT) capsule; Commonly known as: Vitamin   D-2   SUMAtriptan 100 mg tablet; Commonly known as: Imitrex     STOP taking these medications     chlorhexidine 0.12 % solution; Commonly known as: Peridex   chlorhexidine 4 % external liquid; Commonly known as: Hibiclens       Outpatient Follow-Up  Future Appointments   Date Time Provider Department Center   12/18/2024 12:00 PM Benedict Almeida MD [id number]       Mateo Jeffries DO

## 2024-12-04 NOTE — NURSING NOTE
Pt discharged home. IV removed, tip intact. Discharge instructions reviewed with patient. Supplies given to patient. Patient taken safely to car. No further needs.

## 2024-12-09 ENCOUNTER — TELEPHONE (OUTPATIENT)
Dept: OTOLARYNGOLOGY | Facility: HOSPITAL | Age: 34
End: 2024-12-09
Payer: COMMERCIAL

## 2024-12-09 NOTE — TELEPHONE ENCOUNTER
She was called regarding her discomfort. She was giving instruction to try to keep her head elevated. I will see her at her apt.

## 2024-12-10 LAB
LAB AP ASR DISCLAIMER: NORMAL
LABORATORY COMMENT REPORT: NORMAL
Lab: NORMAL
PATH REPORT.FINAL DX SPEC: NORMAL
PATH REPORT.GROSS SPEC: NORMAL
PATH REPORT.RELEVANT HX SPEC: NORMAL
PATH REPORT.TOTAL CANCER: NORMAL

## 2024-12-10 PROCEDURE — 88341 IMHCHEM/IMCYTCHM EA ADD ANTB: CPT | Mod: TC,SUR | Performed by: OTOLARYNGOLOGY

## 2024-12-13 ENCOUNTER — TELEPHONE (OUTPATIENT)
Dept: OTOLARYNGOLOGY | Facility: HOSPITAL | Age: 34
End: 2024-12-13
Payer: COMMERCIAL

## 2024-12-13 ENCOUNTER — OFFICE VISIT (OUTPATIENT)
Dept: OTOLARYNGOLOGY | Facility: HOSPITAL | Age: 34
End: 2024-12-13
Payer: COMMERCIAL

## 2024-12-13 VITALS — WEIGHT: 152 LBS | HEIGHT: 66 IN | BODY MASS INDEX: 24.43 KG/M2

## 2024-12-13 DIAGNOSIS — K11.9 LESION OF PAROTID GLAND: Primary | ICD-10-CM

## 2024-12-13 PROCEDURE — 1036F TOBACCO NON-USER: CPT | Performed by: OTOLARYNGOLOGY

## 2024-12-13 PROCEDURE — 99211 OFF/OP EST MAY X REQ PHY/QHP: CPT | Performed by: OTOLARYNGOLOGY

## 2024-12-13 ASSESSMENT — PATIENT HEALTH QUESTIONNAIRE - PHQ9
2. FEELING DOWN, DEPRESSED OR HOPELESS: NOT AT ALL
1. LITTLE INTEREST OR PLEASURE IN DOING THINGS: NOT AT ALL
SUM OF ALL RESPONSES TO PHQ9 QUESTIONS 1 & 2: 0

## 2024-12-13 ASSESSMENT — PAIN SCALES - GENERAL: PAINLEVEL_OUTOF10: 6

## 2024-12-13 NOTE — TELEPHONE ENCOUNTER
"Patient is S/P parotidectomy with fat graft.  States the site on her stomach is \"draining blood\".  I advised patient to come to [facility] this morning to see PL.  She states she lives 1 1/2 hours away and wanted to talk to someone.  Please advise  "

## 2024-12-13 NOTE — PROGRESS NOTES
History of Present Illness    Huong Carlos was seen on October 2024 at the request of Dr. Payam Johnson for the evaluation and management of a right parotid lesion.  I personally reviewed the scan that was done in October 2024.  It did show this mass in the substance of the right thyroid.  It measured probably more than a centimeter.  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  Of note is that this was discovered because the patient had a left-sided facial paralysis which should prompt some scanning that showed this contralateral lesion.  Her left-sided facial paralysis has recovered with the use of steroids.  On December 2, 2024 she underwent a right parotidectomy with a fat graft to the defect.  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  The postoperative course was complicated with a hematoma of the donor site in the abdomen.  This was drained at the time.  Yesterday she noticed some swelling over the area which prompted this visit.      Physical Exam    Examination of the parotidectomy site shows the incision to be healing nicely.  There is no evidence of any collection or infection.  There is some very very minimal weakness of the marginal branch of the facial.    The abdominal examination shows a small hematoma.  There is no evidence of any infection.  I do not believe that this needs to be drained.    Assessment and Plan    Status post surgery for a benign tumor of the right parotid.  She does have some minimal weakness of the marginal branch of the facial.  This should recover in time.    Small hematoma of the donor site on the abdomen.  We discussed the possibility of drainage but I believe that this will resolve on itself.  The patient was asked to put her pressure dressing over the area.  I told her to keep the appointment for next week.  She is to cancel it that this is not an issue anymore.    She was asked to make an appointment in 3 months.

## 2024-12-18 ENCOUNTER — APPOINTMENT (OUTPATIENT)
Dept: OTOLARYNGOLOGY | Facility: CLINIC | Age: 34
End: 2024-12-18
Payer: COMMERCIAL

## 2025-03-07 ENCOUNTER — APPOINTMENT (OUTPATIENT)
Dept: OTOLARYNGOLOGY | Facility: HOSPITAL | Age: 35
End: 2025-03-07
Payer: COMMERCIAL

## 2025-03-14 ENCOUNTER — APPOINTMENT (OUTPATIENT)
Dept: OTOLARYNGOLOGY | Facility: HOSPITAL | Age: 35
End: 2025-03-14
Payer: COMMERCIAL

## 2025-03-20 NOTE — PROGRESS NOTES
History of Present Illness    Huong Carlos was seen on October 2024 at the request of Dr. Payam Johnson for the evaluation and management of a right parotid lesion.  I personally reviewed the scan that was done in October 2024.  It did show this mass in the substance of the right thyroid.  It measured probably more than a centimeter.  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  Of note is that this was discovered because the patient had a left-sided facial paralysis which should prompt some scanning that showed this contralateral lesion.  Her left-sided facial paralysis has recovered with the use of steroids.  On December 2, 2024 she underwent a right parotidectomy with a fat graft to the defect.  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  The postoperative course was complicated with a hematoma of the donor site in the abdomen.  This was drained at the time.  Her only complaint today has to do with some numbness around the surgical site.      Physical Exam    Examination of the parotidectomy site shows the incision to be healing nicely.  There is no residual weakness of the facial nerve.  He definitely has numbness of the lower external ear and the face anterior to the incision.    The abdominal examination shows the fat donor site to have healed nicely.    Assessment and Plan    Status post surgery for a benign tumor of the right parotid.  The facial nerve weakness has recovered.      I will see her in 6 months.

## 2025-03-21 ENCOUNTER — OFFICE VISIT (OUTPATIENT)
Dept: OTOLARYNGOLOGY | Facility: HOSPITAL | Age: 35
End: 2025-03-21
Payer: COMMERCIAL

## 2025-03-21 VITALS — WEIGHT: 157.5 LBS | BODY MASS INDEX: 25.31 KG/M2 | TEMPERATURE: 97.9 F | HEIGHT: 66 IN

## 2025-03-21 DIAGNOSIS — K11.9 LESION OF PAROTID GLAND: Primary | ICD-10-CM

## 2025-03-21 PROCEDURE — 3008F BODY MASS INDEX DOCD: CPT | Performed by: OTOLARYNGOLOGY

## 2025-03-21 PROCEDURE — 99213 OFFICE O/P EST LOW 20 MIN: CPT | Performed by: OTOLARYNGOLOGY

## 2025-03-21 PROCEDURE — 1036F TOBACCO NON-USER: CPT | Performed by: OTOLARYNGOLOGY

## 2025-03-21 ASSESSMENT — PATIENT HEALTH QUESTIONNAIRE - PHQ9
1. LITTLE INTEREST OR PLEASURE IN DOING THINGS: NOT AT ALL
SUM OF ALL RESPONSES TO PHQ9 QUESTIONS 1 & 2: 0
2. FEELING DOWN, DEPRESSED OR HOPELESS: NOT AT ALL

## 2025-03-21 ASSESSMENT — PAIN SCALES - GENERAL: PAINLEVEL_OUTOF10: 0-NO PAIN

## (undated) DEVICE — DRAIN, WOUND, FLAT, HUBLESS, FULL LENGTH PERFORATION, 7 MM X 20 CM

## (undated) DEVICE — Device

## (undated) DEVICE — SPONGE GAUZE, XRAY SC+RFID, 4X4 16 PLY, STERILE

## (undated) DEVICE — COVER, CART, 45 X 27 X 48 IN, CLEAR

## (undated) DEVICE — COVER, TABLE, 44 X 75 IN, DISPOSABLE, LF, STERILE

## (undated) DEVICE — CLIP, LIGATING, W/ADHESIVE, WIDE SLOT, SMALL, TITANIUM

## (undated) DEVICE — PROBE, STIMULATOR, MONOPOLAR, FLUSH TIP, PRASS, W/HANDLE, STANDARD

## (undated) DEVICE — ELECTRODE, PAIRED SUBDERMAL OTO

## (undated) DEVICE — NEEDLE, HYPODERMIC, 25 G X 1.5 IN, A BEVEL, STERILE

## (undated) DEVICE — DRAPE, INSTRUMENT, W/POUCH, STERI DRAPE, 7 X 11 IN, DISPOSABLE, STERILE

## (undated) DEVICE — EVACUATOR, WOUND, SUCTION, CLOSED, JACKSON-PRATT, 100 CC, SILICONE

## (undated) DEVICE — REST, HEAD, BAGEL, 9 IN

## (undated) DEVICE — SUTURE, VICRYL, 3-0,18 IN, SH, UNDYED

## (undated) DEVICE — SPONGE, DISSECTOR, PEANUT, 3/8, STERILE 5 FOAM HOLDER"

## (undated) DEVICE — SPONGE, HEMOSTATIC, CELLULOSE, SURGICEL, FIBRILLAR, 2 X 4 IN

## (undated) DEVICE — HOLSTER, JET SAFETY

## (undated) DEVICE — SYRINGE, TB, W/NEEDLE, 1 CC, 28 G X 0.5 IN, MAGELLAN, SAFETY

## (undated) DEVICE — COUNTER, NEEDLE, FOAM BLOCK, W/MAGNET, W/BLADE GUARD, 10 COUNT, RED, LF

## (undated) DEVICE — CLIP, LIGATING, W/ADHESIVE PAD, MEDIUM, TITANIUM

## (undated) DEVICE — ELECTRODE, ELECTROSURGICAL, BLADE, INSULATED, ENT/IMA, STERILE

## (undated) DEVICE — SPONGE, LAP, XRAY DECT, SC+RFID, 12X12, STERILE

## (undated) DEVICE — DRAPE, TOWEL, STERI DRAPE, 17 X 11 IN, PLASTIC, STERILE

## (undated) DEVICE — BOWL, UTILITY, 32 OZ, PLASTIC, STERILE

## (undated) DEVICE — SHEARS, CURVED 9CM HARMONIC FOCUS

## (undated) DEVICE — MANIFOLD, 4 PORT NEPTUNE STANDARD

## (undated) DEVICE — TOWEL, SURGICAL, NEURO, O/R, 16 X 26, BLUE, STERILE

## (undated) DEVICE — SUTURE, SILK, 2-0, 30 IN, SH, BLACK